# Patient Record
Sex: MALE | Race: WHITE | NOT HISPANIC OR LATINO | Employment: OTHER | URBAN - NONMETROPOLITAN AREA
[De-identification: names, ages, dates, MRNs, and addresses within clinical notes are randomized per-mention and may not be internally consistent; named-entity substitution may affect disease eponyms.]

---

## 2019-03-07 ENCOUNTER — OFFICE VISIT CONVERTED (OUTPATIENT)
Dept: FAMILY MEDICINE CLINIC | Age: 29
End: 2019-03-07
Attending: NURSE PRACTITIONER

## 2020-03-04 ENCOUNTER — OFFICE VISIT CONVERTED (OUTPATIENT)
Dept: FAMILY MEDICINE CLINIC | Age: 30
End: 2020-03-04
Attending: NURSE PRACTITIONER

## 2021-05-18 NOTE — PROGRESS NOTES
Neto Gonzales  1990     Office/Outpatient Visit    Visit Date: Wed, Mar 4, 2020 05:22 pm    Provider: Inés Gonzalez N.P. (Assistant: Lubna Oakley MA)    Location: [facility]        Electronically signed by Inés Gonzalez N.P. on  03/04/2020 05:58:55 PM                             Subjective:        CC: Bere is a 29 year old White male.  presents today due to complaints of cough and fever X 2 days         HPI:           In regard to the acute upper respiratory infection, unspecified, these have been present for the past one to two days.  The symptoms include body aches, Chills, cough, subjective fever and headache.  He denies exposure to ill contacts.  He has already tried to relieve the symptoms with Dayquil.      ROS:     CONSTITUTIONAL:  Positive for chills, fatigue, fever and body aches.      EYES:  Negative for blurred vision and eye drainage.      E/N/T:  Negative for ear pain and sore throat.      CARDIOVASCULAR:  Negative for chest pain and palpitations.      RESPIRATORY:  Positive for recent cough.   Negative for dyspnea or frequent wheezing.      NEUROLOGICAL:  Positive for headaches.      PSYCHIATRIC:  Negative for depression and suicidal thoughts.          Past Medical History / Family History / Social History:         Last Reviewed on 3/07/2019 11:25 AM by Lance Peña    Past Medical History:             PAST MEDICAL HISTORY         ASD, SLIGHT AORTIC INSUFF.  SBE PROPHYLAXIS     HEMOCHROMOTOSIS CARRIER         CURRENT MEDICAL PROVIDERS:    Cardiologist echocardiogram normal 2017         Surgical History:         graft to heart as infant;         Family History:         Positive for Hypertension.      Positive for Type 1 Diabetes.      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          Substance Abuse History:     Last Reviewed on 3/07/2019 11:25 AM by Lance Peña        Mental Health History:     Last Reviewed on 3/07/2019 11:25 AM by Lance Peña        Communicable Diseases (eg STDs):     Last Reviewed on 3/07/2019 11:25 AM by Lance Peña        Current Problems:     Last Reviewed on 3/07/2019 11:25 AM by Lance Peña    ADD    Acne    Atrial septal defect (ASD)    Anxiety    Dizziness    Other mixed anxiety disorders    Dizziness and giddiness    Cellulitis    Cellulitis of right lower limb        Immunizations:     DTP  (Diphtheria-Tetanus-whole cell Pertussis) 1991    DTP  (Diphtheria-Tetanus-whole cell Pertussis) 2/3/1992    DTP  (Diphtheria-Tetanus-whole cell Pertussis) 10/12/1992    DTP  (Diphtheria-Tetanus-whole cell Pertussis) 8/21/1995    DTP  (Diphtheria-Tetanus-whole cell Pertussis) 1991    Td adult 4/22/2002    Hib PRP-OMP (3-dose) 1991    Hib PRP-OMP (3-dose) 1991    Hib PRP-OMP (3-dose) 2/3/1992    Hib PRP-OMP (3-dose) 10/12/1992    Hep B (pedi/adol, 3-dose schedule) 2/25/2002    Hep B (pedi/adol, 3-dose schedule) 6/26/2002    Hep B (pedi/adol, 3-dose schedule) 10/17/2002    OPV  Poliovirus, live (oral) 1991    OPV  Poliovirus, live (oral) 1991    OPV  Poliovirus, live (oral) 10/12/1992    OPV  Poliovirus, live (oral) 8/21/1995    MMR  (Measles-Mumps-Rubella), live 4/9/1996    MMR  (Measles-Mumps-Rubella), live 10/12/1992    Varicella, live 2/25/2002    FluMist 9/11/2008    Menactra (Meningococcal MCV4P) 6/26/2007        Allergies:     Last Reviewed on 3/07/2019 11:25 AM by Lance Peña    No Known Allergies.        Current Medications:     Last Reviewed on 3/07/2019 11:25 AM by Peña, Lance Gaines Allergy 24 Hour 180mg Tablet [1 tab daily]    OTC Multivitamin Take one tablet once daily         Objective:        Vitals:         Historical:     3/7/2019  BP:    132/82 mm Hg ( (left arm, , standing, );) 3/7/2019  P:   86bpm ( (left arm (BP Cuff), , standing, );) 3/7/2019  Wt:   200lbs    Current: 3/4/2020 5:26:16 PM    Ht:  5 ft, 9.5 in;  Wt: 200.2 lbs;  BMI: 29.1T: 99 F (oral);  BP: 137/84 mm Hg (left arm, standing);  P: 109 bpm (left arm (BP Cuff), standing);  sCr: 0.81 mg/dL;  GFR: 135.79        Exams:     PHYSICAL EXAM:     GENERAL: well developed, well nourished;  no apparent distress;     EYES: PERRL, EOMI     E/N/T: EARS: external auditory canal normal;  both TMs are dull;  NOSE: normal turbinates; no sinus tenderness; OROPHARYNX: oral mucosa is normal; posterior pharynx shows no exudate and post nasal drip;     NECK: range of motion is normal; trachea is midline;     RESPIRATORY: normal appearance and symmetric expansion of chest wall; normal respiratory rate and pattern with no distress; normal breath sounds with no rales, rhonchi, wheezes or rubs;     CARDIOVASCULAR: normal rate; rhythm is regular;     MUSCULOSKELETAL: normal gait;     NEUROLOGIC: mental status: alert and oriented x 3; GROSSLY INTACT     PSYCHIATRIC: appropriate affect and demeanor;         Lab/Test Results:         Influenza A: Positive (03/04/2020),     Influenza B: Negative (03/04/2020),     Performed by:: [email or web address] (03/04/2020),             Assessment:         J09.X9   Influenza due to identified novel influenza A virus with other manifestations       Z13.31   Encounter for screening for depression           ORDERS:         Meds Prescribed:       [New Rx] Xofluza 40 mg oral tablet [take 2 tablets by oral route once], #2 (two) tablets, Refills: 0 (zero)       [New Rx] Tamiflu 75 mg oral capsule [take 1 capsule (75 mg) by oral route 2 times per day], #10 (ten) capsules, Refills: 0 (zero)       [New Rx] Tessalon Perles 100 mg oral capsule [take 1 capsule (100 mg) by oral route every 8 hours as needed for cough], #30 (thirty) capsules, Refills: 0 (zero)         Lab Orders:       [id number]  Infectious  agent antigen detection by immunoassay; Influenza  (In-House)            56576  Infectious agent antigen detection by immunoassay; Influenza  (In-House)              Other Orders:         Depression screen negative  (In-House)                      Plan:         Influenza due to identified novel influenza A virus with other manifestationsIf Xofluza to costly, will check price of Tamiflu. Aware not to take both.         RECOMMENDATIONS given include: Push Fluids, Rest, Follow up if no improvement or worsening symptoms like high fevers, vomiting, weakness, or increasing shortness of air.    .  [facility] Vaccines Flu and Pneumonia updated in Shot record     FOLLOW-UP: for Annual Checkup School/Work Excuse for Tomorrow Friday           Prescriptions:       [New Rx] Xofluza 40 mg oral tablet [take 2 tablets by oral route once], #2 (two) tablets, Refills: 0 (zero)       [New Rx] Tamiflu 75 mg oral capsule [take 1 capsule (75 mg) by oral route 2 times per day], #10 (ten) capsules, Refills: 0 (zero)       [New Rx] Tessalon Perles 100 mg oral capsule [take 1 capsule (100 mg) by oral route every 8 hours as needed for cough], #30 (thirty) capsules, Refills: 0 (zero)           Orders:       [id number]  Infectious agent antigen detection by immunoassay; Influenza  (In-House)            81285  Infectious agent antigen detection by immunoassay; Influenza  (In-House)              Encounter for screening for depression    [facility] PHQ-9 Depression Screening: Completed form scanned and in chart; Total Score 0; Negative Depression Screen           Orders:         Depression screen negative  (In-House)                  Charge Capture:         Primary Diagnosis:     J09.X9  Influenza due to identified novel influenza A virus with other manifestations           Orders:      11851  Office/outpatient visit; established patient, level 3  (In-House)            90820-48  Infectious agent antigen detection by immunoassay; Influenza  (In-House)             34362  Infectious agent antigen detection by immunoassay; Influenza  (In-House)              Z13.31  Encounter for screening for depression           Orders:        Depression screen negative  (In-House)

## 2021-05-18 NOTE — PROGRESS NOTES
Neto Gonzales 1990     Office/Outpatient Visit    Visit Date: Thu, Mar 7, 2019 11:10 am    Provider: Lance Peña N.P. (Assistant: Julianne Bernard MA)    Location: [facility]        Electronically signed by Lance Peña N.P. on  03/07/2019 11:52:37 AM                             SUBJECTIVE:        CC:     Bere is a 28 year old White male.  Patient is here for right neck/shoulder pain. He fell a couple of weeks ago and a gate landed on his chest.          HPI:         Patient complains of neck pain.  The location of discomfort is posterior and on the right side.  It radiates to the right shoulder and around right scapula.  The pain is characterized as moderate in intensity, intermittent, and aching.  Initial onset was 2 weeks ago.  The precipitating event [place] to have [place] was [place] on a building and fell off platform landed on his right side , no cuts or broken bones.      ROS:     CONSTITUTIONAL:  Negative for chills, fatigue and fever.      CARDIOVASCULAR:  Negative for chest pain, orthopnea, paroxysmal nocturnal dyspnea and pedal edema.      RESPIRATORY:  Negative for dyspnea and cough.      GASTROINTESTINAL:  Negative for abdominal pain, heartburn, constipation, diarrhea, and stool changes.      MUSCULOSKELETAL:  Positive for Neck pain and right shoulder pain.      PSYCHIATRIC:  Negative for anxiety and depression.          PMH/FMH/SH:     Last Reviewed on 3/07/2019 11:25 AM by Lance Peña    Past Medical History:             PAST MEDICAL HISTORY         ASD, SLIGHT AORTIC INSUFF.  SBE PROPHYLAXIS     HEMOCHROMOTOSIS CARRIER         CURRENT MEDICAL PROVIDERS:    Cardiologist echocardiogram normal 2017         Surgical History:         graft to heart as infant;         Family History:         Positive for Hypertension.      Positive for Type 1 Diabetes.      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              Current Problems:     Last Reviewed on 3/07/2019 11:25 AM by Lance Peña    Cellulitis     Anxiety     Dizziness     Atrial septal defect (ASD)     ADD     Acne     Neck pain         Immunizations:     DTP  (Diphtheria-Tetanus-whole cell Pertussis) 1991     DTP  (Diphtheria-Tetanus-whole cell Pertussis) 2/3/1992     DTP  (Diphtheria-Tetanus-whole cell Pertussis) 10/12/1992     DTP  (Diphtheria-Tetanus-whole cell Pertussis) 8/21/1995     DTP  (Diphtheria-Tetanus-whole cell Pertussis) 1991     Td adult 4/22/2002     Hib PRP-OMP (3-dose) 1991     Hib PRP-OMP (3-dose) 1991     Hib PRP-OMP (3-dose) 2/3/1992     Hib PRP-OMP (3-dose) 10/12/1992     Hep B (pedi/adol, 3-dose schedule) 2/25/2002     Hep B (pedi/adol, 3-dose schedule) 6/26/2002     Hep B (pedi/adol, 3-dose schedule) 10/17/2002     OPV  Poliovirus, live (oral) 1991     OPV  Poliovirus, live (oral) 1991     OPV  Poliovirus, live (oral) 10/12/1992     OPV  Poliovirus, live (oral) 8/21/1995     MMR  (Measles-Mumps-Rubella), live 4/9/1996     MMR  (Measles-Mumps-Rubella), live 10/12/1992     Varicella, live 2/25/2002     FluMist 9/11/2008     Menactra (Meningococcal MCV4P) 6/26/2007         Allergies:     Last Reviewed on 3/07/2019 11:25 AM by Lance Peña      No Known Drug Allergies.         Current Medications:     Last Reviewed on 3/07/2019 11:25 AM by Lance Peña    Allegra Allergy 24 Hour 180mg Tablet 1 tab daily     OTC Multivitamin Take one tablet once daily         OBJECTIVE:        Vitals:         Current: 3/7/2019 11:15:24 AM    Ht:  5 ft, 9.5 in;  Wt: 200 lbs;  BMI: 29.1    T: 98.3 F (oral);  BP: 132/82 mm Hg (left arm, standing);  P: 86 bpm (left arm (BP Cuff), standing);  sCr: 0.81 mg/dL;   GFR: 136.92        Exams:     PHYSICAL EXAM:     GENERAL: Vitals recorded well developed, well nourished;  well groomed;  no apparent distress;     RESPIRATORY: normal respiratory rate and pattern with no distress; normal breath sounds with no rales, rhonchi, wheezes or rubs;     CARDIOVASCULAR: normal rate; rhythm is regular;  normal S1; normal S2; no systolic murmur; no cyanosis; no edema;     GASTROINTESTINAL: nontender, nondistended; no hepatosplenomegaly or masses; no bruits;     MUSCULOSKELETAL: Right lateral posterior neck tenderness with Limited ROM, side to side mary. Right posterior shoulder tenderness along scapula area, Neg drop arm Neg Weathers right shoulder;     NEUROLOGIC: GROSSLY INTACT     PSYCHIATRIC:  appropriate affect and demeanor; normal speech pattern; grossly normal memory;         ASSESSMENT:           723.1   S16.1XXA  Neck pain              DDx:         ORDERS:         Meds Prescribed:       Mobic (Meloxicam) 7.5mg Tablet take one tablet daily with food, do not take with other NSAIDS, Motrin, or Ibuprofen, take x 4 weeks then stop  #30 (Thirty) tablet(s) Refills: 0       Baclofen 10mg Tablet 1 tab po TID prn for pain/muscle pain  #60 (Sixty) tablet(s) Refills: 1         Procedures Ordered:       REFER  Referral to Specialist or Other Facility  (Send-Out)                   PLAN:          Neck pain         REFERRALS:  Referral initiated to physical therapy ( [facility] ).            Prescriptions:       Mobic (Meloxicam) 7.5mg Tablet take one tablet daily with food, do not take with other NSAIDS, Motrin, or Ibuprofen, take x 4 weeks then stop  #30 (Thirty) tablet(s) Refills: 0       Baclofen 10mg Tablet 1 tab po TID prn for pain/muscle pain  #60 (Sixty) tablet(s) Refills: 1           Orders:       REFER  Referral to Specialist or Other Facility  (Send-Out)               CHARGE CAPTURE:           Primary Diagnosis:     723.1 Neck pain            S16.1XXA    Strain of muscle, fascia and tendon at  neck level, initial encounter              Orders:          [id number]   Office/outpatient visit; established patient, level 3  (In-House)

## 2021-07-01 VITALS
HEART RATE: 86 BPM | TEMPERATURE: 98.3 F | BODY MASS INDEX: 28.63 KG/M2 | WEIGHT: 200 LBS | HEIGHT: 70 IN | SYSTOLIC BLOOD PRESSURE: 132 MMHG | DIASTOLIC BLOOD PRESSURE: 82 MMHG

## 2021-07-02 VITALS
HEART RATE: 109 BPM | TEMPERATURE: 99 F | SYSTOLIC BLOOD PRESSURE: 137 MMHG | WEIGHT: 200.2 LBS | BODY MASS INDEX: 28.66 KG/M2 | DIASTOLIC BLOOD PRESSURE: 84 MMHG | HEIGHT: 70 IN

## 2021-07-27 ENCOUNTER — PROCEDURE VISIT (OUTPATIENT)
Dept: FAMILY MEDICINE CLINIC | Age: 31
End: 2021-07-27

## 2021-07-27 VITALS
BODY MASS INDEX: 28.29 KG/M2 | SYSTOLIC BLOOD PRESSURE: 125 MMHG | WEIGHT: 191 LBS | HEIGHT: 69 IN | DIASTOLIC BLOOD PRESSURE: 79 MMHG | HEART RATE: 80 BPM | TEMPERATURE: 98.3 F

## 2021-07-27 DIAGNOSIS — B07.9 WART ON THUMB: Primary | ICD-10-CM

## 2021-07-27 PROCEDURE — 17110 DESTRUCTION B9 LES UP TO 14: CPT | Performed by: FAMILY MEDICINE

## 2021-07-27 NOTE — PROGRESS NOTES
Neto Gonzales presents to [facility].    Chief Complaint: Wart on his right thumb    Subjective       History of Present Illness:  HPI patient has a wart on his right thumb that is really bothering him.  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  He has not tried any treatments for this    Review of Systems:  Review of Systems   Constitutional: Negative for chills, fatigue and fever.   HENT: Negative for congestion, ear discharge and sore throat.    Respiratory: Negative for shortness of breath.    Cardiovascular: Negative for chest pain.   Gastrointestinal: Negative for abdominal pain, constipation, diarrhea, nausea, vomiting and GERD.   Genitourinary: Negative for flank pain.   Neurological: Negative for dizziness and headache.   Psychiatric/Behavioral: Negative for depressed mood.        Objective   Medical History:  Past Medical History:   • Acne   • ADD (attention deficit disorder)   • Anxiety   • ASD (atrial septal defect)     SLIGHT AORTIC INSUFF   • Cellulitis of right lower limb   • Dizziness and giddiness   • Hemochromatosis carrier   • Influenza due to identified novel influenza A virus with other manifestations   • Other mixed anxiety disorders   • SBE (subacute bacterial endocarditis) prophylaxis candidate     Past Surgical History:   • CARDIAC SURGERY    graft to heart as infant;       Family History   Problem Relation Age of Onset   • Hypertension Other    • Diabetes type I Other    • Other Other         BLOOD DISORDER     Social History     Tobacco Use   • Smoking status: Never Smoker   • Smokeless tobacco: Never Used   Substance Use Topics   • Alcohol use: Not on file       Health Maintenance Due   Topic Date Due   • ANNUAL PHYSICAL  Never done   • COVID-19 Vaccine (1) Never done   • TDAP/TD VACCINES (2 - Tdap) 04/22/2012   • HEPATITIS C SCREENING  Never done        Immunization History   Administered Date(s) Administered   • DTP 1991,  "1990, 02/03/1992, 10/12/1992, 08/21/1995   • Hepatitis B 02/25/2002, 06/26/2002, 10/17/2002   • HiB 1990, 1990, 02/03/1992, 10/12/1992   • Influenza, Unspecified 09/11/2008   • MMR 10/12/1992, 04/09/1996   • Meningococcal MCV4P (Menactra) 06/26/2007   • OPV 1991, 1991, 10/12/1992, 08/21/1995   • Td 04/22/2002   • Varicella 02/25/2002       No Known Allergies     Medications:  No current outpatient medications on file prior to visit.     No current facility-administered medications on file prior to visit.       Vital Signs:   /79 (BP Location: Left arm, Patient Position: Sitting)   Pulse 80   Temp 98.3 °F (36.8 °C)   Ht 176.5 cm (69.49\")   Wt 86.6 kg (191 lb)   BMI 27.81 kg/m²       Physical Exam:  Physical Exam  Vitals reviewed.   Constitutional:       General: He is not in acute distress.     Appearance: Normal appearance. He is normal weight. He is not ill-appearing.   HENT:      Head: Normocephalic and atraumatic.   Eyes:      Extraocular Movements: Extraocular movements intact.      Pupils: Pupils are equal, round, and reactive to light.   Pulmonary:      Effort: Pulmonary effort is normal.   Skin:         Neurological:      General: No focal deficit present.      Mental Status: He is alert and oriented to person, place, and time.   Psychiatric:         Mood and Affect: Mood normal.         Behavior: Behavior normal.         Thought Content: Thought content normal.         Judgment: Judgment normal.         Result Review      The following data was reviewed by Beatrice Beal MD on 07/27/2021.  No results found for: WBC, HGB, HCT, MCV, PLT  No results found for: GLUCOSE, BUN, CREATININE, EGFRIFNONA, EGFRIFAFRI, BCR, K, CO2, CALCIUM, PROTENTOTREF, ALBUMIN, LABIL2, BILIRUBIN, AST, ALT  No results found for: CHOL, CHLPL, TRIG, HDL, LDL, LDLDIRECT  No results found for: TSH  No results found for: HGBA1C  No results found for: PSA           Cryotherapy, Skin " Lesion    Date/Time: 7/27/2021 6:37 PM  Performed by: Beatrice Beal MD  Authorized by: Beatrice Beal MD   Local anesthesia used: no    Anesthesia:  Local anesthesia used: no    Sedation:  Patient sedated: no    Patient tolerance: patient tolerated the procedure well with no immediate complications  Comments: Wart on right thumb status post liquid nitrogen 3   10-second sprays.  Patient tolerated well.  Good skin cleanings were advised to patient and to use topical antibiotic and Band-Aids and keep area clean and covered              Assessment and Plan:          Diagnoses and all orders for this visit:    1. Wart on thumb (Primary)-status post liquid nitrogen.  Patient tolerated well.  He is to follow-up for second freezing if it is not completely resolved.  Patient is aware of the risk of this procedure including bleeding infection scarring and recurrence        Follow Up   No follow-ups on file.  Patient was given instructions and counseling regarding his condition or for health maintenance advice. Please see specific information pulled into the AVS if appropriate.

## 2022-01-14 ENCOUNTER — OFFICE VISIT (OUTPATIENT)
Dept: FAMILY MEDICINE CLINIC | Age: 32
End: 2022-01-14

## 2022-01-14 VITALS
DIASTOLIC BLOOD PRESSURE: 87 MMHG | OXYGEN SATURATION: 97 % | WEIGHT: 198 LBS | TEMPERATURE: 98.8 F | BODY MASS INDEX: 26.82 KG/M2 | HEART RATE: 96 BPM | SYSTOLIC BLOOD PRESSURE: 126 MMHG | HEIGHT: 72 IN

## 2022-01-14 DIAGNOSIS — J02.9 PHARYNGITIS, UNSPECIFIED ETIOLOGY: Primary | ICD-10-CM

## 2022-01-14 LAB
EXPIRATION DATE: NORMAL
EXPIRATION DATE: NORMAL
FLUAV AG UPPER RESP QL IA.RAPID: NOT DETECTED
FLUBV AG UPPER RESP QL IA.RAPID: NOT DETECTED
INTERNAL CONTROL: NORMAL
INTERNAL CONTROL: NORMAL
Lab: NORMAL
Lab: NORMAL
S PYO AG THROAT QL: NEGATIVE
SARS-COV-2 AG UPPER RESP QL IA.RAPID: NOT DETECTED

## 2022-01-14 PROCEDURE — 87147 CULTURE TYPE IMMUNOLOGIC: CPT | Performed by: NURSE PRACTITIONER

## 2022-01-14 PROCEDURE — 99213 OFFICE O/P EST LOW 20 MIN: CPT | Performed by: NURSE PRACTITIONER

## 2022-01-14 PROCEDURE — 87081 CULTURE SCREEN ONLY: CPT | Performed by: NURSE PRACTITIONER

## 2022-01-14 PROCEDURE — 87880 STREP A ASSAY W/OPTIC: CPT | Performed by: NURSE PRACTITIONER

## 2022-01-14 PROCEDURE — 87428 SARSCOV & INF VIR A&B AG IA: CPT | Performed by: NURSE PRACTITIONER

## 2022-01-14 RX ORDER — FEXOFENADINE HCL 180 MG/1
180 TABLET ORAL DAILY
COMMUNITY

## 2022-01-14 RX ORDER — MULTIPLE VITAMINS W/ MINERALS TAB 9MG-400MCG
1 TAB ORAL DAILY
COMMUNITY

## 2022-01-14 RX ORDER — LANOLIN ALCOHOL/MO/W.PET/CERES
1000 CREAM (GRAM) TOPICAL DAILY
COMMUNITY

## 2022-01-14 RX ORDER — PSEUDOEPHEDRINE HCL 30 MG
30 TABLET ORAL EVERY 4 HOURS PRN
Qty: 30 TABLET | Refills: 0 | Status: SHIPPED | OUTPATIENT
Start: 2022-01-14

## 2022-01-14 RX ORDER — PREDNISONE 10 MG/1
30 TABLET ORAL DAILY
Qty: 15 TABLET | Refills: 0 | Status: SHIPPED | OUTPATIENT
Start: 2022-01-14 | End: 2022-01-19

## 2022-01-14 RX ORDER — MULTIVIT WITH MINERALS/LUTEIN
250 TABLET ORAL DAILY
COMMUNITY

## 2022-01-14 RX ORDER — AMOXICILLIN 875 MG/1
875 TABLET, COATED ORAL 2 TIMES DAILY
Qty: 20 TABLET | Refills: 0 | Status: SHIPPED | OUTPATIENT
Start: 2022-01-14 | End: 2022-01-24

## 2022-01-14 NOTE — PROGRESS NOTES
Chief Complaint  Neto Gonzales presents to [facility] for Sore Throat, Fever, and URI    Subjective          History of Present Illness    Bere is here today with c/o sore throat, congestion, chills, subjective fever, fatigue. Symptoms started 5 days ago. Taking OTC cold and flu medication for symptoms. No known ill contacts. Has not had covid or influenza vaccine.     Review of Systems      No Known Allergies   Past Medical History:   Diagnosis Date   • Acne    • ADD (attention deficit disorder)    • Anxiety    • ASD (atrial septal defect)      SLIGHT AORTIC INSUFF   • Cellulitis of right lower limb    • Dizziness and giddiness    • Hemochromatosis carrier    • Influenza due to identified novel influenza A virus with other manifestations    • Other mixed anxiety disorders    • SBE (subacute bacterial endocarditis) prophylaxis candidate      Current Outpatient Medications   Medication Sig Dispense Refill   • fexofenadine (ALLEGRA) 180 MG tablet Take 180 mg by mouth Daily.     • multivitamin with minerals tablet tablet Take 1 tablet by mouth Daily.     • vitamin B-12 (CYANOCOBALAMIN) 1000 MCG tablet Take 1,000 mcg by mouth Daily.     • vitamin C (ASCORBIC ACID) 250 MG tablet Take 250 mg by mouth Daily.     • amoxicillin (AMOXIL) 875 MG tablet Take 1 tablet by mouth 2 (Two) Times a Day for 10 days. 20 tablet 0   • predniSONE (DELTASONE) 10 MG tablet Take 3 tablets by mouth Daily for 5 days. 15 tablet 0   • pseudoephedrine (Sudafed) 30 MG tablet Take 1 tablet by mouth Every 4 (Four) Hours As Needed for Congestion. 30 tablet 0     No current facility-administered medications for this visit.     Past Surgical History:   Procedure Laterality Date   • CARDIAC SURGERY      graft to heart as infant;       Social History     Tobacco Use   • Smoking status: Never Smoker   • Smokeless tobacco: Never Used   Vaping Use   • Vaping Use: Never used   Substance Use Topics   • Alcohol use: Not on  "file   • Drug use: Not on file     Family History   Problem Relation Age of Onset   • Hypertension Other    • Diabetes type I Other    • Other Other         BLOOD DISORDER     Health Maintenance Due   Topic Date Due   • ANNUAL PHYSICAL  Never done   • TDAP/TD VACCINES (2 - Tdap) 04/22/2012   • HEPATITIS C SCREENING  Never done      Immunization History   Administered Date(s) Administered   • DTP 1990, 1990, 02/03/1992, 10/12/1992, 08/21/1995   • Hepatitis B 02/25/2002, 06/26/2002, 10/17/2002   • HiB 1991, 1991, 02/03/1992, 10/12/1992   • Influenza LAIV (Nasal) 09/11/2008   • Influenza, Unspecified 09/11/2008   • MMR 10/12/1992, 04/09/1996   • Meningococcal MCV4P (Menactra) 06/26/2007   • OPV 1991, 1991, 10/12/1992, 08/21/1995   • Td 04/22/2002   • Varicella 02/25/2002        Objective     Vitals:    01/14/22 0937   BP: 126/87   Pulse: 96   Temp: 98.8 °F (37.1 °C)   SpO2: 97%   Weight: 89.8 kg (198 lb)   Height: 182.9 cm (72\")     Body mass index is 26.85 kg/m².     Physical Exam  Vitals reviewed.   Constitutional:       General: He is not in acute distress.     Appearance: He is well-developed. He is ill-appearing.   HENT:      Head: Normocephalic and atraumatic.      Right Ear: Tympanic membrane normal.      Left Ear: Tympanic membrane normal.      Nose: Nose normal.      Mouth/Throat:      Pharynx: Uvula midline. Posterior oropharyngeal erythema present.   Cardiovascular:      Rate and Rhythm: Normal rate and regular rhythm.   Pulmonary:      Effort: Pulmonary effort is normal.      Breath sounds: Normal breath sounds.   Neurological:      Mental Status: He is alert and oriented to person, place, and time.   Psychiatric:         Mood and Affect: Mood and affect normal.           Result Review :     The following data was reviewed by: ÁNGEL Harrison on 01/14/2022:          POCT rapid strep A (01/14/2022 09:55)  POCT SARS-CoV-2 Antigen SHU + Flu (01/14/2022 09:54)           "        Assessment and Plan      Diagnoses and all orders for this visit:    1. Pharyngitis, unspecified etiology (Primary)  -     POCT SARS-CoV-2 Antigen SHU + Flu  -     POCT rapid strep A  -     amoxicillin (AMOXIL) 875 MG tablet; Take 1 tablet by mouth 2 (Two) Times a Day for 10 days.  Dispense: 20 tablet; Refill: 0  -     predniSONE (DELTASONE) 10 MG tablet; Take 3 tablets by mouth Daily for 5 days.  Dispense: 15 tablet; Refill: 0  -     pseudoephedrine (Sudafed) 30 MG tablet; Take 1 tablet by mouth Every 4 (Four) Hours As Needed for Congestion.  Dispense: 30 tablet; Refill: 0    Rapid flu, strep, and COVID testing negative.  Will treat with antibiotics based on symptoms and exam findings.  Additionally, advised warm salt water gargles, Tylenol or ibuprofen per package instructions.          Follow Up     Return for As needed for persistent or worsening symptoms.

## 2022-01-16 LAB — BACTERIA SPEC AEROBE CULT: NORMAL

## 2023-10-13 ENCOUNTER — OFFICE VISIT (OUTPATIENT)
Dept: FAMILY MEDICINE CLINIC | Age: 33
End: 2023-10-13
Payer: COMMERCIAL

## 2023-10-13 VITALS
SYSTOLIC BLOOD PRESSURE: 121 MMHG | HEART RATE: 80 BPM | DIASTOLIC BLOOD PRESSURE: 85 MMHG | BODY MASS INDEX: 26.9 KG/M2 | WEIGHT: 198.6 LBS | HEIGHT: 72 IN

## 2023-10-13 DIAGNOSIS — R13.10 DYSPHAGIA, UNSPECIFIED TYPE: Primary | ICD-10-CM

## 2023-10-13 DIAGNOSIS — Z00.00 PREVENTATIVE HEALTH CARE: ICD-10-CM

## 2023-10-13 NOTE — PROGRESS NOTES
Diagnoses and all orders for this visit:    1. Dysphagia, unspecified type (Primary)  Comments:  Will refer to gastroenterology for further evaluation and possible scope versus swallow study.  Orders:  -     Ambulatory Referral to Gastroenterology    2. Preventative health care  Comments:  Patient has not been seen in office in 18 months.  We will get him set up for physical with one of our PCPs.            Subjective     CHIEF COMPLAINT    Chief Complaint   Patient presents with    Difficulty Swallowing     Feels like food gets stuck x 1 year, getting worse            History of Present Illness  This is a 33-year-old male presenting to the clinic complaining of difficulty swallowing foods for the last year.  He states this occurs with any type of food he eats, but he is typically able to get food down if he takes a big drink of water with it.  He does not have any difficulty swallowing liquids.  He has not had any vomiting related to this and has not had any weight loss over the last year.            Review of Systems   Constitutional:  Negative for chills, diaphoresis and unexpected weight change.   HENT:  Positive for trouble swallowing.    Gastrointestinal:  Negative for abdominal pain, nausea and vomiting.            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      multivitamin with minerals tablet tablet Take 1 tablet by mouth Daily.      vitamin B-12 (CYANOCOBALAMIN) 1000 MCG tablet Take 1 tablet by mouth Daily.      vitamin C (ASCORBIC ACID) 250 MG tablet Take 1 tablet by mouth Daily.      [DISCONTINUED] pseudoephedrine (Sudafed) 30 MG tablet Take 1 tablet by mouth Every 4 (Four) Hours As Needed for Congestion. 30 tablet 0     No current facility-administered medications on file prior to visit.            /85 (BP Location: Left arm, Patient Position: Sitting)   Pulse 80   Ht 182.9 cm (72\")   Wt 90.1 kg (198 lb 9.6 oz)   BMI 26.94 kg/mý          Objective     Physical Exam  Vitals and nursing note reviewed.   Constitutional:       General: He is not in acute distress.     Appearance: Normal appearance.   HENT:      Head: Normocephalic and atraumatic.      Mouth/Throat:      Mouth: Mucous membranes are moist.      Pharynx: Oropharynx is clear. No posterior oropharyngeal erythema.   Cardiovascular:      Rate and Rhythm: Normal rate and regular rhythm.      Heart sounds: Normal heart sounds.   Pulmonary:      Effort: Pulmonary effort is normal.      Breath sounds: Normal breath sounds.   Abdominal:      General: There is no distension.      Palpations: Abdomen is soft.      Tenderness: There is no abdominal tenderness.   Skin:     General: Skin is warm and dry.   Neurological:      Mental Status: He is alert and oriented to person, place, and time.   Psychiatric:         Mood and Affect: Mood normal.         Behavior: Behavior normal.                      Diagnoses and all orders for " this visit:    1. Dysphagia, unspecified type (Primary)  Comments:  Will refer to gastroenterology for further evaluation and possible scope versus swallow study.  Orders:  -     Ambulatory Referral to Gastroenterology    2. Preventative health care  Comments:  Patient has not been seen in office in 18 months.  We will get him set up for physical with one of our PCPs.                           FOR FULL DISCHARGE INSTRUCTIONS/COMMENTS/HANDOUTS please see the   AVS

## 2023-10-30 ENCOUNTER — OFFICE VISIT (OUTPATIENT)
Dept: GASTROENTEROLOGY | Facility: CLINIC | Age: 33
End: 2023-10-30
Payer: COMMERCIAL

## 2023-10-30 VITALS
SYSTOLIC BLOOD PRESSURE: 130 MMHG | DIASTOLIC BLOOD PRESSURE: 80 MMHG | WEIGHT: 199.2 LBS | BODY MASS INDEX: 27.89 KG/M2 | HEART RATE: 76 BPM | HEIGHT: 71 IN

## 2023-10-30 DIAGNOSIS — R09.89 THROAT CLEARING: ICD-10-CM

## 2023-10-30 DIAGNOSIS — R13.10 DYSPHAGIA, UNSPECIFIED TYPE: Primary | ICD-10-CM

## 2023-10-30 DIAGNOSIS — T17.308A CHOKING, INITIAL ENCOUNTER: ICD-10-CM

## 2023-10-30 PROCEDURE — 99214 OFFICE O/P EST MOD 30 MIN: CPT | Performed by: NURSE PRACTITIONER

## 2023-10-30 RX ORDER — OMEPRAZOLE 20 MG/1
20 CAPSULE, DELAYED RELEASE ORAL DAILY
Qty: 30 CAPSULE | Refills: 3 | Status: SHIPPED | OUTPATIENT
Start: 2023-10-30

## 2023-10-30 NOTE — PROGRESS NOTES
Chief Complaint        Difficulty Swallowing    History of Present Illness      Neto Gonzales is a 33 y.o. male who presents to [facility] as a new patient for trouble swallowing.     He admits for the past 6 months he has been having trouble swallowing. Worse with solids. He admits he has been choking and had to cough up the food. He denies any reflux. He does have hx allergies and takes OTC allergy medicine. He denies any N&V.     EGD/colon---never had    He admits his bowels move EOD lately. He admits this is new for him. He admits he doesn't eat healthy. He denies any rectal bleeding or melena.     GI FH-----He denies any         Results       Result Review :   The following data was reviewed by: ÁNGEL Fuller on 10/30/2023                      Past Medical History       Past Medical History:   Diagnosis Date    Acne     ADD (attention deficit disorder)     Anxiety     ASD (atrial septal defect)      SLIGHT AORTIC INSUFF    Cellulitis of right lower limb     Dizziness and giddiness     Hemochromatosis carrier     Influenza due to identified novel influenza A virus with other manifestations     Other mixed anxiety disorders     SBE (subacute bacterial endocarditis) prophylaxis candidate        Past Surgical History:   Procedure Laterality Date    CARDIAC SURGERY      graft to heart as infant;          Current Outpatient Medications:     fexofenadine (ALLEGRA) 180 MG tablet, Take 1 tablet by mouth Daily., Disp: , Rfl:     multivitamin with minerals tablet tablet, Take 1 tablet by mouth Daily., Disp: , Rfl:     vitamin B-12 (CYANOCOBALAMIN) 1000 MCG tablet, Take 1 tablet by mouth Daily., Disp: , Rfl:     vitamin C (ASCORBIC ACID) 250 MG tablet, Take 1 tablet by mouth Daily., Disp: , Rfl:     omeprazole (priLOSEC) 20 MG capsule, Take 1 capsule by mouth Daily., Disp: 30 capsule, Rfl: 3     No Known Allergies    Family History   Problem Relation Age of Onset     "Hypertension Other     Diabetes type I Other     Other Other         BLOOD DISORDER        Social History     Social History Narrative    Not on file       Objective       Objective     Vital Signs:   /80 (BP Location: Left arm, Patient Position: Sitting, Cuff Size: Large Adult)   Pulse 76   Ht 180.3 cm (71\")   Wt 90.4 kg (199 lb 3.2 oz)   BMI 27.78 kg/m²     Body mass index is 27.78 kg/m².    Review of Systems   Constitutional:  Negative for appetite change, fatigue, fever and unexpected weight change.   HENT:  Positive for congestion, postnasal drip and trouble swallowing.    Respiratory:  Positive for choking. Negative for cough, chest tightness, shortness of breath, wheezing and stridor.    Cardiovascular:  Negative for chest pain, palpitations and leg swelling.   Gastrointestinal:  Negative for abdominal distention, abdominal pain, anal bleeding, blood in stool, constipation, diarrhea, nausea, rectal pain and vomiting.        Physical Exam  Constitutional:       General: He is not in acute distress.     Appearance: He is well-developed. He is not ill-appearing.   HENT:      Head: Normocephalic.   Eyes:      Pupils: Pupils are equal, round, and reactive to light.   Cardiovascular:      Rate and Rhythm: Normal rate and regular rhythm.      Heart sounds: Normal heart sounds.   Pulmonary:      Effort: Pulmonary effort is normal.      Breath sounds: Normal breath sounds.   Abdominal:      General: Bowel sounds are normal. There is no distension.      Palpations: Abdomen is soft. There is no mass.      Tenderness: There is no abdominal tenderness. There is no guarding or rebound.      Hernia: No hernia is present.   Musculoskeletal:         General: Normal range of motion.   Skin:     General: Skin is warm and dry.   Neurological:      Mental Status: He is alert and oriented to person, place, and time.   Psychiatric:         Speech: Speech normal.         Behavior: Behavior normal.         Judgment: " Judgment normal.              Assessment & Plan          Assessment and Plan    Diagnoses and all orders for this visit:    1. Dysphagia, unspecified type (Primary)  -     Case Request; Standing  -     Follow Anesthesia Guidelines / Protocol; Future  -     Obtain Informed Consent; Future  -     Case Request  -     omeprazole (priLOSEC) 20 MG capsule; Take 1 capsule by mouth Daily.  Dispense: 30 capsule; Refill: 3    2. Choking, initial encounter  -     Case Request; Standing  -     Follow Anesthesia Guidelines / Protocol; Future  -     Obtain Informed Consent; Future  -     Case Request    3. Throat clearing  -     Case Request; Standing  -     Follow Anesthesia Guidelines / Protocol; Future  -     Obtain Informed Consent; Future  -     Case Request    Other orders  -     Obtain Informed Consent; Standing  -     Verify NPO; Standing      Reviewed medical history with him today.  Given his history and current symptoms recommend EGD with Dr. Hoffmann for further evaluation.  Patient is agreeable to the scope.  We will start him on omeprazole 20 mg daily and see how he does.  Continue daily allergy medication.  Bowels moving well currently.  Recommend he increase his fiber and water as needed.  Patient to call the office with any issues.  Patient to follow-up with me after his scope.  Patient is agreeable to the plan.    The risk of the endoscopy were discussed in detail. Possible risks/complications, benefits, and alternatives to surgical or invasive procedure have been explained to patient and/or legal guardian; risks include bleeding, infection, and perforation. Patient has been evaluated and can tolerate anesthesia and/or sedation.           Follow Up       Follow Up   Return for F/U AFTER PROCEDURE.  Patient was given instructions and counseling regarding his condition or for health maintenance advice. Please see specific information pulled into the AVS if appropriate.

## 2023-10-31 ENCOUNTER — TELEPHONE (OUTPATIENT)
Dept: GASTROENTEROLOGY | Facility: CLINIC | Age: 33
End: 2023-10-31
Payer: COMMERCIAL

## 2023-10-31 NOTE — TELEPHONE ENCOUNTER
Neto Gonzales  1990    Patient requested to Reschedule their EGD. I have offered to reschedule this patient and patient has declined. Patient has been rescheduled to 11/2/2023.    Reason for cancelling/rescheduling: urgent cx list    This procedure was ordered by ÁNGEL Sal for an important reason. We want to inform you that there are risks associated with not proceeding with the procedure at this time such as a delay in diagnosis, risk of incurable disease, or cancer.    Patient plans to call us back to reschedule: no    Updated clearance needed?: no    If yes, clearance request has been submitted to: n/a    Is the patient currently on any injectable medications for weight loss or diabetes? no    If yes, are they aware that they will need to discontinue this 7 days prior to their procedure? N/a    Patient verbalized understanding for all of the above information.

## 2023-11-10 ENCOUNTER — PATIENT ROUNDING (BHMG ONLY) (OUTPATIENT)
Dept: GASTROENTEROLOGY | Facility: CLINIC | Age: 33
End: 2023-11-10
Payer: COMMERCIAL

## 2023-11-10 ENCOUNTER — PATIENT MESSAGE (OUTPATIENT)
Dept: GASTROENTEROLOGY | Facility: CLINIC | Age: 33
End: 2023-11-10
Payer: COMMERCIAL

## 2023-11-10 NOTE — PROGRESS NOTES
"11/10/2023      Hello, may I speak with Neto Gonzales     My name is Miryam. I am calling from [facility]. I show that you had a recent visit with ÁNGEL Sal.    Before we get started may I verify your date of birth? 1990    I am calling to officially welcome you to our practice and ask about your recent visit. Is this a good time to talk? Yes     Tell me about your visit with us. What things went well? \"Everything went well, I didn't have any issues\"     We strive to ensure that we protect your safety and privacy. Is there anything we could have done to improve this during your visit?    No     We're always looking for ways to make our patients' experiences even better. Do you have recommendations on ways we may improve?  No     Overall were you satisfied with your first visit to our practice? Yes     I appreciate you taking the time to speak with me today. Is there anything else I can do for you? No     I am glad to hear that you had a very good visit and I appreciate you taking the time to provide feedback on this call. We would greatly appreciate you filling out a survey if you receive one in the mail, email or text. This is a great opportunity to provide any additional feedback that you may think of after this call as well.       Thank you, and have a great day.   "

## 2023-12-07 NOTE — PRE-PROCEDURE INSTRUCTIONS
"Instructed on date and arrival time of 0930. Come to entrance \"C\".  Must have  over age 18 to drive home.  May have two visitors; however, children under 12 must stay in waiting room.  Discussed diet/NPO.  May take medications as usual except for blood thinners, diabetic medications, or weight loss medications.  Bring list of medications to hospital.  Verbalized understanding of instructions given.  Instructed to call for questions or concerns.  "

## 2023-12-08 ENCOUNTER — PATIENT ROUNDING (BHMG ONLY) (OUTPATIENT)
Dept: GASTROENTEROLOGY | Facility: CLINIC | Age: 33
End: 2023-12-08
Payer: COMMERCIAL

## 2023-12-08 NOTE — PROGRESS NOTES
"12/8/2023      Hello, may I speak with Neto Gonzales     My name is Miryam. I am calling from [facility]. I show that you had a recent visit with ÁNGEL Sal.    Before we get started may I verify your date of birth? 1990    I am calling to officially welcome you to our practice and ask about your recent visit. Is this a good time to talk? Yes     Tell me about your visit with us. What things went well? \"It went good, questions were answered, I learned more about what I have going on and I am scheduled for scope\"     We strive to ensure that we protect your safety and privacy. Is there anything we could have done to improve this during your visit?    No     We're always looking for ways to make our patients' experiences even better. Do you have recommendations on ways we may improve?  No     Overall were you satisfied with your first visit to our practice?  Yes     I appreciate you taking the time to speak with me today. Is there anything else I can do for you?  No     I am glad to hear that you had a very good visit and I appreciate you taking the time to provide feedback on this call. We would greatly appreciate you filling out a survey if you receive one in the mail, email or text. This is a great opportunity to provide any additional feedback that you may think of after this call as well.       Thank you, and have a great day.   "

## 2023-12-13 ENCOUNTER — ANESTHESIA EVENT (OUTPATIENT)
Dept: GASTROENTEROLOGY | Facility: HOSPITAL | Age: 33
End: 2023-12-13
Payer: COMMERCIAL

## 2023-12-13 NOTE — ANESTHESIA PREPROCEDURE EVALUATION
Anesthesia Evaluation     Patient summary reviewed and Nursing notes reviewed   NPO Solid Status: > 8 hours  NPO Liquid Status: > 2 hours           Airway   Mallampati: I  TM distance: >3 FB  Neck ROM: full  No difficulty expected  Dental - normal exam     Pulmonary - negative pulmonary ROS and normal exam   Cardiovascular - normal exam    (+) valvular problems/murmurs      Neuro/Psych  (+) dizziness/light headedness, psychiatric history Anxiety and ADD  GI/Hepatic/Renal/Endo - negative ROS     Musculoskeletal (-) negative ROS    Abdominal  - normal exam    Bowel sounds: normal.   Substance History - negative use     OB/GYN negative ob/gyn ROS         Other                    Anesthesia Plan    ASA 2     general   total IV anesthesia  (Total IV Anesthesia    Patient understands anesthesia not responsible for dental damage.  )  intravenous induction     Anesthetic plan, risks, benefits, and alternatives have been provided, discussed and informed consent has been obtained with: patient.    Plan discussed with CRNA.    CODE STATUS:

## 2023-12-14 ENCOUNTER — ANESTHESIA (OUTPATIENT)
Dept: GASTROENTEROLOGY | Facility: HOSPITAL | Age: 33
End: 2023-12-14
Payer: COMMERCIAL

## 2023-12-14 ENCOUNTER — HOSPITAL ENCOUNTER (OUTPATIENT)
Facility: HOSPITAL | Age: 33
Setting detail: HOSPITAL OUTPATIENT SURGERY
Discharge: HOME | End: 2023-12-14
Attending: INTERNAL MEDICINE | Admitting: INTERNAL MEDICINE
Payer: COMMERCIAL

## 2023-12-14 VITALS
OXYGEN SATURATION: 96 % | DIASTOLIC BLOOD PRESSURE: 82 MMHG | SYSTOLIC BLOOD PRESSURE: 123 MMHG | WEIGHT: 189.6 LBS | RESPIRATION RATE: 15 BRPM | HEART RATE: 78 BPM | BODY MASS INDEX: 26.44 KG/M2 | TEMPERATURE: 98.7 F

## 2023-12-14 DIAGNOSIS — R13.10 DYSPHAGIA, UNSPECIFIED TYPE: ICD-10-CM

## 2023-12-14 DIAGNOSIS — R09.89 THROAT CLEARING: ICD-10-CM

## 2023-12-14 DIAGNOSIS — T17.308A CHOKING, INITIAL ENCOUNTER: ICD-10-CM

## 2023-12-14 PROCEDURE — 88305 TISSUE EXAM BY PATHOLOGIST: CPT | Performed by: INTERNAL MEDICINE

## 2023-12-14 PROCEDURE — 25010000002 PROPOFOL 10 MG/ML EMULSION: Performed by: MARRIAGE & FAMILY THERAPIST

## 2023-12-14 PROCEDURE — 25810000003 LACTATED RINGERS PER 1000 ML

## 2023-12-14 PROCEDURE — C1726 CATH, BAL DIL, NON-VASCULAR: HCPCS | Performed by: INTERNAL MEDICINE

## 2023-12-14 RX ORDER — SODIUM CHLORIDE, SODIUM LACTATE, POTASSIUM CHLORIDE, CALCIUM CHLORIDE 600; 310; 30; 20 MG/100ML; MG/100ML; MG/100ML; MG/100ML
30 INJECTION, SOLUTION INTRAVENOUS CONTINUOUS
Status: DISCONTINUED | OUTPATIENT
Start: 2023-12-14 | End: 2023-12-14 | Stop reason: HOSPADM

## 2023-12-14 RX ORDER — LIDOCAINE HYDROCHLORIDE 20 MG/ML
INJECTION, SOLUTION EPIDURAL; INFILTRATION; INTRACAUDAL; PERINEURAL AS NEEDED
Status: DISCONTINUED | OUTPATIENT
Start: 2023-12-14 | End: 2023-12-14 | Stop reason: SURG

## 2023-12-14 RX ORDER — PROPOFOL 10 MG/ML
VIAL (ML) INTRAVENOUS AS NEEDED
Status: DISCONTINUED | OUTPATIENT
Start: 2023-12-14 | End: 2023-12-14 | Stop reason: SURG

## 2023-12-14 RX ADMIN — SODIUM CHLORIDE, POTASSIUM CHLORIDE, SODIUM LACTATE AND CALCIUM CHLORIDE 30 ML/HR: 600; 310; 30; 20 INJECTION, SOLUTION INTRAVENOUS at 09:59

## 2023-12-14 RX ADMIN — LIDOCAINE HYDROCHLORIDE 50 MG: 20 INJECTION, SOLUTION EPIDURAL; INFILTRATION; INTRACAUDAL; PERINEURAL at 10:47

## 2023-12-14 RX ADMIN — PROPOFOL 175 MCG/KG/MIN: 10 INJECTION, EMULSION INTRAVENOUS at 10:47

## 2023-12-14 RX ADMIN — PROPOFOL 150 MG: 10 INJECTION, EMULSION INTRAVENOUS at 10:47

## 2023-12-14 NOTE — ANESTHESIA POSTPROCEDURE EVALUATION
Patient: Neto Gonzales    Procedure Summary       Date: 12/14/23 Room / Location: [facility] ENDOSCOPY 3 / [facility] ENDOSCOPY    Anesthesia Start: 1044 Anesthesia Stop: 1102    Procedure: ESOPHAGOGASTRODUODENOSCOPY WITH BIOPSIES, BALLOON DILATATION 18-20 Diagnosis:       Dysphagia, unspecified type      Choking, initial encounter      Throat clearing      (Dysphagia, unspecified type [R13.10])      (Choking, initial encounter [T17.308A])      (Throat clearing [R09.89])    Surgeons: Nevin Hoffmann MD Provider: Rahul Sherman CRNA    Anesthesia Type: general ASA Status: 2            Anesthesia Type: general    Vitals  Vitals Value Taken Time   /82 12/14/23 1117   Temp 37.1 °C (98.7 °F) 12/14/23 1115   Pulse 78 12/14/23 1117   Resp 15 12/14/23 1115   SpO2 97 % 12/14/23 1117   Vitals shown include unfiled device data.        Post Anesthesia Care and Evaluation    Patient location during evaluation: PACU  Patient participation: complete - patient participated  Level of consciousness: awake  Pain scale: See nurse's notes for pain score.  Pain management: adequate    Airway patency: patent  Anesthetic complications: No anesthetic complications  PONV Status: none  Cardiovascular status: acceptable  Respiratory status: acceptable and spontaneous ventilation  Hydration status: acceptable    Comments: Patient seen and examined postoperatively; vital signs stable; SpO2 greater than or equal to 90%; cardiopulmonary status stable; nausea/vomiting adequately controlled; pain adequately controlled; no apparent anesthesia complications; patient discharged from anesthesia care when discharge criteria were met

## 2023-12-14 NOTE — H&P
Pre Procedure History & Physical    Chief Complaint:   dysphagia    Subjective     HPI:   34 yo M here for eval of dysphagia.    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 He has never used smokeless tobacco. He reports that he does not drink alcohol and does not use drugs.    Medications:   Medications Prior to Admission   Medication Sig Dispense Refill Last Dose    fexofenadine (ALLEGRA) 180 MG tablet Take 1 tablet by mouth Daily.       multivitamin with minerals tablet tablet Take 1 tablet by mouth Daily.       omeprazole (priLOSEC) 20 MG capsule Take 1 capsule by mouth Daily. 30 capsule 3     vitamin B-12 (CYANOCOBALAMIN) 1000 MCG tablet Take 1 tablet by mouth Daily.       vitamin C (ASCORBIC ACID) 250 MG tablet Take 1 tablet by mouth Daily.          Allergies:  Patient has no known allergies.    ROS:    Pertinent items are noted in HPI     Objective     Weight 86 kg (189 lb 9.5 oz).    Physical Exam   Constitutional: Pt is oriented to person, place, and time and well-developed, well-nourished, and in no distress.   Mouth/Throat: Oropharynx is clear and moist.   Neck: Normal range of motion.   Cardiovascular: Normal rate, regular rhythm and normal heart sounds.     Pulmonary/Chest: Effort normal and breath sounds normal.   Abdominal: Soft. Nontender  Skin: Skin is warm and dry.   Psychiatric: Mood, memory, affect and judgment normal.     Assessment & Plan     Diagnosis:  Dysphagia    Anticipated Surgical Procedure:  EGD    The risks, benefits, and alternatives of this procedure have been discussed with the patient or the responsible party- the patient understands and agrees to proceed.

## 2023-12-15 LAB
CYTO UR: NORMAL
LAB AP CASE REPORT: NORMAL
LAB AP CLINICAL INFORMATION: NORMAL
PATH REPORT.FINAL DX SPEC: NORMAL
PATH REPORT.GROSS SPEC: NORMAL

## 2023-12-19 ENCOUNTER — TELEPHONE (OUTPATIENT)
Dept: GASTROENTEROLOGY | Facility: CLINIC | Age: 33
End: 2023-12-19
Payer: COMMERCIAL

## 2023-12-19 NOTE — TELEPHONE ENCOUNTER
----- Message from ÁNGEL Fuller sent at 12/18/2023  1:58 PM EST -----  EGD biopsies did show mildly increased intraepithelial lymphocytes in the duodenal biopsy.  This is mild nonspecific.  Would check celiac blood test if patient is still having symptoms.  All other findings were benign.  Patient to have office follow-up scheduled.

## 2024-02-01 ENCOUNTER — OFFICE VISIT (OUTPATIENT)
Dept: FAMILY MEDICINE CLINIC | Age: 34
End: 2024-02-01
Payer: COMMERCIAL

## 2024-02-01 VITALS
SYSTOLIC BLOOD PRESSURE: 129 MMHG | OXYGEN SATURATION: 98 % | TEMPERATURE: 98.1 F | BODY MASS INDEX: 26.8 KG/M2 | HEIGHT: 71 IN | HEART RATE: 91 BPM | DIASTOLIC BLOOD PRESSURE: 93 MMHG | WEIGHT: 191.4 LBS

## 2024-02-01 DIAGNOSIS — R22.0 FACIAL SWELLING: Primary | ICD-10-CM

## 2024-02-01 DIAGNOSIS — R09.81 SINUS CONGESTION: ICD-10-CM

## 2024-02-01 LAB
EXPIRATION DATE: NORMAL
FLUAV AG UPPER RESP QL IA.RAPID: NOT DETECTED
FLUBV AG UPPER RESP QL IA.RAPID: NOT DETECTED
INTERNAL CONTROL: NORMAL
Lab: NORMAL
SARS-COV-2 AG UPPER RESP QL IA.RAPID: NOT DETECTED

## 2024-02-01 PROCEDURE — 99213 OFFICE O/P EST LOW 20 MIN: CPT | Performed by: PHYSICIAN ASSISTANT

## 2024-02-01 PROCEDURE — 87428 SARSCOV & INF VIR A&B AG IA: CPT | Performed by: PHYSICIAN ASSISTANT

## 2024-02-01 RX ORDER — METHYLPREDNISOLONE 4 MG/1
TABLET ORAL
Qty: 21 EACH | Refills: 0 | Status: SHIPPED | OUTPATIENT
Start: 2024-02-01

## 2024-02-01 RX ORDER — CETIRIZINE HYDROCHLORIDE 10 MG/1
10 TABLET ORAL DAILY
COMMUNITY

## 2024-02-01 NOTE — PROGRESS NOTES
Subjective     CHIEF COMPLAINT    Chief Complaint   Patient presents with    Facial Pain     Pt c/o of forehead swelling and (R) side of face. Pt woke up like this.             History of Present Illness  This is a 33-year-old male presenting to the clinic complaining of facial swelling over his forehead since this morning.  He has had some sinus congestion, pressure and subjective fever but states those symptoms are improving.  Otherwise he has been feeling well and denies any rashes, shortness of breath, nausea or vomiting.  He has not used any new foods, medications or products recently.  He has no history of similar symptoms.            Review of Systems   Constitutional:  Positive for fatigue and fever.   HENT:  Positive for congestion, facial swelling, sinus pressure, sinus pain and voice change. Negative for rhinorrhea.    Respiratory:  Positive for cough. Negative for shortness of breath and wheezing.    Gastrointestinal:  Negative for nausea and vomiting.            Past Medical History:   Diagnosis Date    Acne     ADD (attention deficit disorder)     Anxiety     ASD (atrial septal defect)      SLIGHT AORTIC INSUFF    Cellulitis of right lower limb     Dizziness and giddiness     Hemochromatosis carrier     Influenza due to identified novel influenza A virus with other manifestations     Other mixed anxiety disorders     SBE (subacute bacterial endocarditis) prophylaxis candidate             Past Surgical History:   Procedure Laterality Date    CARDIAC SURGERY      graft to heart as infant;     ENDOSCOPY N/A 12/14/2023    Procedure: ESOPHAGOGASTRODUODENOSCOPY WITH BIOPSIES, BALLOON DILATATION 18-20;  Surgeon: Nevin Hoffmann MD;  Location: [facility] ENDOSCOPY;  Service: Gastroenterology;  Laterality: N/A;  DUODENITIS            Family History   Problem Relation Age of Onset    Hypertension Other     Diabetes type I Other     Other Other         BLOOD DISORDER            Social History  "    Socioeconomic History    Marital status: Single    Number of children: 0   Tobacco Use    Smoking status: Never    Smokeless tobacco: Never   Vaping Use    Vaping Use: Never used   Substance and Sexual Activity    Alcohol use: Yes     Comment: social    Drug use: Never    Sexual activity: Defer            No Known Allergies         Current Outpatient Medications on File Prior to Visit   Medication Sig Dispense Refill    fexofenadine (ALLEGRA) 180 MG tablet Take 1 tablet by mouth Daily.      multivitamin with minerals tablet tablet Take 1 tablet by mouth Daily.      omeprazole (priLOSEC) 20 MG capsule Take 1 capsule by mouth Daily. 30 capsule 3    vitamin B-12 (CYANOCOBALAMIN) 1000 MCG tablet Take 1 tablet by mouth Daily.      vitamin C (ASCORBIC ACID) 250 MG tablet Take 1 tablet by mouth Daily.      cetirizine (zyrTEC) 10 MG tablet Take 1 tablet by mouth Daily.       No current facility-administered medications on file prior to visit.            /93   Pulse 91   Temp 98.1 °F (36.7 °C) (Oral)   Ht 180.3 cm (70.98\")   Wt 86.8 kg (191 lb 6.4 oz)   SpO2 98% Comment: room air  BMI 26.71 kg/m²          Objective     Physical Exam  Vitals and nursing note reviewed.   Constitutional:       General: He is not in acute distress.     Appearance: Normal appearance.   HENT:      Head: Atraumatic. Mass (see images below. tender to palpation, soft, non-mobile) present.      Right Ear: Tympanic membrane, ear canal and external ear normal.      Left Ear: Tympanic membrane, ear canal and external ear normal.      Nose: Congestion and rhinorrhea present.      Mouth/Throat:      Mouth: Mucous membranes are moist.      Pharynx: Oropharynx is clear. No posterior oropharyngeal erythema.   Eyes:      Extraocular Movements: Extraocular movements intact.      Conjunctiva/sclera: Conjunctivae normal.      Pupils: Pupils are equal, round, and reactive to light.   Cardiovascular:      Rate and Rhythm: Normal rate and regular " rhythm.      Heart sounds: Normal heart sounds.   Pulmonary:      Effort: Pulmonary effort is normal. No respiratory distress.      Breath sounds: Normal breath sounds.   Skin:     General: Skin is warm and dry.      Findings: No erythema.   Neurological:      Mental Status: He is alert and oriented to person, place, and time.   Psychiatric:         Mood and Affect: Mood normal.         Behavior: Behavior normal.                        Lab Results (last 24 hours)       Procedure Component Value Units Date/Time    POCT SARS-CoV-2 Antigen SHU + Flu [[id number]] Collected: 02/01/24 1143    Specimen: Swab Updated: 02/01/24 1144     SARS Antigen Not Detected     Influenza A Antigen SHU Not Detected     Influenza B Antigen SHU Not Detected     Internal Control Passed     Lot Number [id number]     Expiration Date 9/14/24                          Assessment & Plan  Facial swelling  Exact etiology is unclear.  There is no erythema of skin to suggest abscess.  He has not had any injury or trauma.  I did consider CT scan for further evaluation, but insurance would not approve it and given the above findings I do feel that this is reasonable.  Recommend ice packs.  He already takes Zyrtec and Allegra daily.  Could consider adding Benadryl, but I will also send Medrol Dosepak to his pharmacy to help with swelling.  Plan to check on him early next week.  Sinus congestion  COVID and flu testing negative.  The symptoms are improving, continue to monitor.    Orders Placed This Encounter   Procedures    POCT SARS-CoV-2 Antigen SHU + Flu     New Medications Ordered This Visit   Medications    methylPREDNISolone (MEDROL) 4 MG dose pack     Sig: Take by mouth as directed on package instructions.     Dispense:  21 each     Refill:  0                FOR FULL DISCHARGE INSTRUCTIONS/COMMENTS/HANDOUTS please see the   AVS

## 2024-02-13 ENCOUNTER — TELEPHONE (OUTPATIENT)
Dept: GASTROENTEROLOGY | Facility: CLINIC | Age: 34
End: 2024-02-13

## 2024-04-11 ENCOUNTER — OFFICE VISIT (OUTPATIENT)
Dept: GASTROENTEROLOGY | Facility: CLINIC | Age: 34
End: 2024-04-11
Payer: COMMERCIAL

## 2024-04-11 VITALS
WEIGHT: 203.4 LBS | BODY MASS INDEX: 27.55 KG/M2 | HEIGHT: 72 IN | HEART RATE: 97 BPM | DIASTOLIC BLOOD PRESSURE: 80 MMHG | SYSTOLIC BLOOD PRESSURE: 120 MMHG

## 2024-04-11 DIAGNOSIS — K21.9 GASTROESOPHAGEAL REFLUX DISEASE WITHOUT ESOPHAGITIS: Primary | ICD-10-CM

## 2024-04-11 DIAGNOSIS — R13.10 DYSPHAGIA, UNSPECIFIED TYPE: ICD-10-CM

## 2024-04-11 DIAGNOSIS — K26.9 DUODENAL ULCER: ICD-10-CM

## 2024-04-11 PROCEDURE — 99214 OFFICE O/P EST MOD 30 MIN: CPT | Performed by: NURSE PRACTITIONER

## 2024-04-11 RX ORDER — OMEPRAZOLE 20 MG/1
20 CAPSULE, DELAYED RELEASE ORAL DAILY
Qty: 30 CAPSULE | Refills: 11 | Status: SHIPPED | OUTPATIENT
Start: 2024-04-11

## 2024-04-11 NOTE — PROGRESS NOTES
Chief Complaint   Difficulty Swallowing    History of Present Illness       Neto Gonzales is a 33 y.o. male who presents to [facility] for follow-up for trouble swallowing.  He was last seen in the office by me on 10/30/2023.    He admits for the past 6 months he has been having trouble swallowing. Worse with solids. He admits he has been choking and had to cough up the food. He denies any reflux. He does have hx allergies and takes OTC allergy medicine. He denies any N&V.      EGD/colon---never had     He admits his bowels move EOD lately. He admits this is new for him. He admits he doesn't eat healthy. He denies any rectal bleeding or melena.      GI FH-----He denies any     He underwent EGD with Dr. Hoffmann on 12/14/2023 for trouble swallowing.  EGD did show some duodenal erosions.  Everything else was normal.  He was empirically dilated.  EGD path showed mildly increased intraepithelial lymphocytes in the duodenal biopsy.  This is mild and nonspecific.  Will check celiac blood test.    He admits initially after the EGD with dilation he was doing really well.  Unfortunately he ran out of the omeprazole 20 mg and just did not think he needed it anymore.  After quite a few days he started having trouble swallowing again and was having more reflux.  So he restarted it over-the-counter once a day.  He admits his bowels are moving well.  Good appetite.  Results       Result Review :                       Past Medical History       Past Medical History:   Diagnosis Date    Acne     ADD (attention deficit disorder)     Anxiety     ASD (atrial septal defect)      SLIGHT AORTIC INSUFF    Cellulitis of right lower limb     Dizziness and giddiness     Hemochromatosis carrier     Influenza due to identified novel influenza A virus with other manifestations     Other mixed anxiety disorders     SBE (subacute bacterial endocarditis) prophylaxis candidate        Past Surgical History:  "  Procedure Laterality Date    CARDIAC SURGERY      graft to heart as infant;     ENDOSCOPY N/A 12/14/2023    Procedure: ESOPHAGOGASTRODUODENOSCOPY WITH BIOPSIES, BALLOON DILATATION 18-20;  Surgeon: Nevin Hoffmann MD;  Location: [facility] ENDOSCOPY;  Service: Gastroenterology;  Laterality: N/A;  DUODENITIS         Current Outpatient Medications:     cetirizine (zyrTEC) 10 MG tablet, Take 1 tablet by mouth Daily., Disp: , Rfl:     fexofenadine (ALLEGRA) 180 MG tablet, Take 1 tablet by mouth Daily., Disp: , Rfl:     multivitamin with minerals tablet tablet, Take 1 tablet by mouth Daily., Disp: , Rfl:     omeprazole (priLOSEC) 20 MG capsule, Take 1 capsule by mouth Daily., Disp: 30 capsule, Rfl: 11    vitamin B-12 (CYANOCOBALAMIN) 1000 MCG tablet, Take 1 tablet by mouth Daily., Disp: , Rfl:     vitamin C (ASCORBIC ACID) 250 MG tablet, Take 1 tablet by mouth Daily., Disp: , Rfl:      No Known Allergies    Family History   Problem Relation Age of Onset    Hypertension Other     Diabetes type I Other     Other Other         BLOOD DISORDER        Social History     Social History Narrative    Not on file       Objective       Review of Systems   Constitutional:  Negative for appetite change, fatigue, fever, unexpected weight gain and unexpected weight loss.   HENT:  Negative for trouble swallowing.    Respiratory:  Negative for cough, choking, chest tightness, shortness of breath, wheezing and stridor.    Cardiovascular:  Negative for chest pain, palpitations and leg swelling.   Gastrointestinal:  Positive for GERD and indigestion. Negative for abdominal distention, abdominal pain, anal bleeding, blood in stool, constipation, diarrhea, nausea, rectal pain and vomiting.        Vital Signs:   /80 (BP Location: Left arm, Patient Position: Sitting, Cuff Size: Adult)   Pulse 97   Ht 182.9 cm (72\")   Wt 92.3 kg (203 lb 6.4 oz)   BMI 27.59 kg/m²       Physical Exam  Constitutional:       General: He is not in " acute distress.     Appearance: He is well-developed. He is not ill-appearing.   HENT:      Head: Normocephalic.   Eyes:      Pupils: Pupils are equal, round, and reactive to light.   Cardiovascular:      Rate and Rhythm: Normal rate and regular rhythm.      Heart sounds: Normal heart sounds.   Pulmonary:      Effort: Pulmonary effort is normal.      Breath sounds: Normal breath sounds.   Abdominal:      General: Bowel sounds are normal. There is no distension.      Palpations: Abdomen is soft. There is no mass.      Tenderness: There is no abdominal tenderness. There is no guarding or rebound.      Hernia: No hernia is present.   Musculoskeletal:         General: Normal range of motion.   Skin:     General: Skin is warm and dry.   Neurological:      Mental Status: He is alert and oriented to person, place, and time.   Psychiatric:         Speech: Speech normal.         Behavior: Behavior normal.         Judgment: Judgment normal.           Assessment & Plan          Assessment and Plan    Diagnoses and all orders for this visit:    1. Gastroesophageal reflux disease without esophagitis (Primary)  -     omeprazole (priLOSEC) 20 MG capsule; Take 1 capsule by mouth Daily.  Dispense: 30 capsule; Refill: 11  -     Celiac Panel Reflex To Titer; Future    2. Dysphagia, unspecified type  -     Celiac Panel Reflex To Titer; Future    3. Duodenal ulcer  -     Celiac Panel Reflex To Titer; Future      Reviewed EGD results with him today.  GERD seems much better when he is on the omeprazole 20 mg daily.  I will refill it.  Given the duodenal erosions noted in the abnormal lymphocytes seen will check celiac panel for further evaluation.  Continue GERD precautions.  Bowels moving well currently.  Good appetite.  Patient to call the office with any issues.  Patient to follow-up with me in 6 months.  Patient is agreeable to the plan.          Follow Up       Follow Up   Return in about 6 months (around 10/11/2024) for GERD,  DYSPHAGIA.  Patient was given instructions and counseling regarding his condition or for health maintenance advice. Please see specific information pulled into the AVS if appropriate.

## 2024-04-29 ENCOUNTER — OFFICE VISIT (OUTPATIENT)
Dept: FAMILY MEDICINE CLINIC | Age: 34
End: 2024-04-29
Payer: COMMERCIAL

## 2024-04-29 VITALS
DIASTOLIC BLOOD PRESSURE: 88 MMHG | SYSTOLIC BLOOD PRESSURE: 115 MMHG | HEART RATE: 80 BPM | BODY MASS INDEX: 26.95 KG/M2 | WEIGHT: 199 LBS | HEIGHT: 72 IN

## 2024-04-29 DIAGNOSIS — M79.601 RIGHT ARM PAIN: Primary | ICD-10-CM

## 2024-04-29 PROCEDURE — 99213 OFFICE O/P EST LOW 20 MIN: CPT | Performed by: PHYSICIAN ASSISTANT

## 2024-04-29 NOTE — PROGRESS NOTES
Subjective     CHIEF COMPLAINT    Chief Complaint   Patient presents with    Arm Pain     Pt c/o (R) arm numbness and pain onset for a couple of days.   Pt c/o bruise on bicep with knots present under the bruise.              History of Present Illness  This is a right hand dominant 33-year-old male presenting the clinic complaining of right arm bruising and tingling.  He states for the last couple of days he has had some tingling in his right arm, especially around his inner elbow.  He denies any weakness of his arm.  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  He also has noted some bruising on his left bicep area as well.  He states he came here today because his sister made him come in for an evaluation.            Review of Systems   Constitutional:  Negative for chills and fever.   Musculoskeletal:  Positive for myalgias. Negative for neck pain.   Skin:  Positive for wound (bruising).   Neurological:  Positive for numbness. Negative for weakness.            Past Medical History:   Diagnosis Date    Acne     ADD (attention deficit disorder)     Anxiety     ASD (atrial septal defect)      SLIGHT AORTIC INSUFF    Cellulitis of right lower limb     Dizziness and giddiness     Hemochromatosis carrier     Influenza due to identified novel influenza A virus with other manifestations     Other mixed anxiety disorders     SBE (subacute bacterial endocarditis) prophylaxis candidate             Past Surgical History:   Procedure Laterality Date    CARDIAC SURGERY      graft to heart as infant;     ENDOSCOPY N/A 12/14/2023    Procedure: ESOPHAGOGASTRODUODENOSCOPY WITH BIOPSIES, BALLOON DILATATION 18-20;  Surgeon: Nevin Hoffmann MD;  Location: [facility] ENDOSCOPY;  Service: Gastroenterology;  Laterality: N/A;  DUODENITIS            Family History   Problem Relation Age of Onset     "Hypertension Other     Diabetes type I Other     Other Other         BLOOD DISORDER            Social History     Socioeconomic History    Marital status: Single    Number of children: 0   Tobacco Use    Smoking status: Never    Smokeless tobacco: Never   Vaping Use    Vaping status: Never Used   Substance and Sexual Activity    Alcohol use: Yes     Comment: social    Drug use: Never    Sexual activity: Defer            No Known Allergies         Current Outpatient Medications on File Prior to Visit   Medication Sig Dispense Refill    cetirizine (zyrTEC) 10 MG tablet Take 1 tablet by mouth Daily.      fexofenadine (ALLEGRA) 180 MG tablet Take 1 tablet by mouth Daily.      multivitamin with minerals tablet tablet Take 1 tablet by mouth Daily.      omeprazole (priLOSEC) 20 MG capsule Take 1 capsule by mouth Daily. 30 capsule 11    vitamin B-12 (CYANOCOBALAMIN) 1000 MCG tablet Take 1 tablet by mouth Daily.      vitamin C (ASCORBIC ACID) 250 MG tablet Take 1 tablet by mouth Daily.       No current facility-administered medications on file prior to visit.            /88 (BP Location: Left arm, Patient Position: Sitting)   Pulse 80   Ht 182.9 cm (72\")   Wt 90.3 kg (199 lb)   BMI 26.99 kg/m²          Objective     Physical Exam  Vitals and nursing note reviewed.   Constitutional:       General: He is not in acute distress.     Appearance: Normal appearance.   Cardiovascular:      Pulses:           Radial pulses are 2+ on the right side and 2+ on the left side.   Pulmonary:      Effort: Pulmonary effort is normal. No respiratory distress.   Musculoskeletal:      Right upper arm: Tenderness (mild, over bruised areas) present. No swelling or edema.      Left upper arm: No swelling, edema or tenderness.      Right hand: Normal strength. Normal sensation.      Left hand: Normal strength. Normal sensation.   Skin:     General: Skin is warm and dry.      Findings: Bruising (light bruising noted to bilateral upper arms) " present. No erythema.   Neurological:      Mental Status: He is alert and oriented to person, place, and time.      Sensory: Sensation is intact. No sensory deficit (bilateral upper arms).   Psychiatric:         Mood and Affect: Mood normal.         Behavior: Behavior normal.                           Assessment & Plan  Right arm pain  No red flags on exam.  Despite reported tingling, Bere has no decreased sensation on exam.  Likely he has some mild trauma to a nerve, and this will continue to improve.  I have scheduled follow-up with PCP if it does not and he can return to the office sooner if he feels like things are worsening.  We discussed obtaining ultrasound of bruises/swollen areas and upper arm to better characterize, but he declines.  Could also consider EMG if tingling persists.                    FOR FULL DISCHARGE INSTRUCTIONS/COMMENTS/HANDOUTS please see the   AVS

## 2024-05-16 ENCOUNTER — TELEPHONE (OUTPATIENT)
Dept: GASTROENTEROLOGY | Facility: CLINIC | Age: 34
End: 2024-05-16
Payer: COMMERCIAL

## 2024-05-23 ENCOUNTER — OFFICE VISIT (OUTPATIENT)
Dept: FAMILY MEDICINE CLINIC | Age: 34
End: 2024-05-23
Payer: COMMERCIAL

## 2024-05-23 ENCOUNTER — LAB (OUTPATIENT)
Dept: LAB | Facility: HOSPITAL | Age: 34
End: 2024-05-23
Payer: COMMERCIAL

## 2024-05-23 VITALS
TEMPERATURE: 98.3 F | HEIGHT: 72 IN | SYSTOLIC BLOOD PRESSURE: 123 MMHG | HEART RATE: 85 BPM | DIASTOLIC BLOOD PRESSURE: 81 MMHG | BODY MASS INDEX: 26.87 KG/M2 | WEIGHT: 198.4 LBS | OXYGEN SATURATION: 98 %

## 2024-05-23 DIAGNOSIS — R53.83 FATIGUE, UNSPECIFIED TYPE: ICD-10-CM

## 2024-05-23 DIAGNOSIS — Z13.6 ENCOUNTER FOR SCREENING FOR CARDIOVASCULAR DISORDERS: ICD-10-CM

## 2024-05-23 DIAGNOSIS — J30.9 ALLERGIC RHINITIS, UNSPECIFIED SEASONALITY, UNSPECIFIED TRIGGER: ICD-10-CM

## 2024-05-23 DIAGNOSIS — Z11.59 ENCOUNTER FOR SCREENING FOR OTHER VIRAL DISEASES: ICD-10-CM

## 2024-05-23 DIAGNOSIS — K21.9 GASTROESOPHAGEAL REFLUX DISEASE WITHOUT ESOPHAGITIS: ICD-10-CM

## 2024-05-23 DIAGNOSIS — Z00.00 ANNUAL PHYSICAL EXAM: Primary | ICD-10-CM

## 2024-05-23 DIAGNOSIS — R13.10 DYSPHAGIA, UNSPECIFIED TYPE: ICD-10-CM

## 2024-05-23 DIAGNOSIS — R06.02 SHORTNESS OF BREATH: ICD-10-CM

## 2024-05-23 DIAGNOSIS — K26.9 DUODENAL ULCER: ICD-10-CM

## 2024-05-23 DIAGNOSIS — M79.10 MYALGIA: ICD-10-CM

## 2024-05-23 DIAGNOSIS — Z23 ENCOUNTER FOR IMMUNIZATION: ICD-10-CM

## 2024-05-23 LAB
ALBUMIN SERPL-MCNC: 4.8 G/DL (ref 3.5–5.2)
ALBUMIN/GLOB SERPL: 1.5 G/DL
ALP SERPL-CCNC: 89 U/L (ref 39–117)
ALT SERPL W P-5'-P-CCNC: 23 U/L (ref 1–41)
ANION GAP SERPL CALCULATED.3IONS-SCNC: 11.4 MMOL/L (ref 5–15)
AST SERPL-CCNC: 23 U/L (ref 1–40)
BILIRUB SERPL-MCNC: 0.4 MG/DL (ref 0–1.2)
BUN SERPL-MCNC: 10 MG/DL (ref 6–20)
BUN/CREAT SERPL: 10.9 (ref 7–25)
CALCIUM SPEC-SCNC: 9.8 MG/DL (ref 8.6–10.5)
CHLORIDE SERPL-SCNC: 102 MMOL/L (ref 98–107)
CO2 SERPL-SCNC: 23.6 MMOL/L (ref 22–29)
CREAT SERPL-MCNC: 0.92 MG/DL (ref 0.76–1.27)
DEPRECATED RDW RBC AUTO: 41.1 FL (ref 37–54)
EGFRCR SERPLBLD CKD-EPI 2021: 113.3 ML/MIN/1.73
ERYTHROCYTE [DISTWIDTH] IN BLOOD BY AUTOMATED COUNT: 11.9 % (ref 12.3–15.4)
GLOBULIN UR ELPH-MCNC: 3.1 GM/DL
GLUCOSE SERPL-MCNC: 92 MG/DL (ref 65–99)
HCT VFR BLD AUTO: 48 % (ref 37.5–51)
HCV AB SER QL: NORMAL
HGB BLD-MCNC: 16.3 G/DL (ref 13–17.7)
MCH RBC QN AUTO: 31.3 PG (ref 26.6–33)
MCHC RBC AUTO-ENTMCNC: 34 G/DL (ref 31.5–35.7)
MCV RBC AUTO: 92.3 FL (ref 79–97)
PLATELET # BLD AUTO: 263 10*3/MM3 (ref 140–450)
PMV BLD AUTO: 9.2 FL (ref 6–12)
POTASSIUM SERPL-SCNC: 4.1 MMOL/L (ref 3.5–5.2)
PROT SERPL-MCNC: 7.9 G/DL (ref 6–8.5)
RBC # BLD AUTO: 5.2 10*6/MM3 (ref 4.14–5.8)
SODIUM SERPL-SCNC: 137 MMOL/L (ref 136–145)
TSH SERPL DL<=0.05 MIU/L-ACNC: 2.08 UIU/ML (ref 0.27–4.2)
WBC NRBC COR # BLD AUTO: 7.56 10*3/MM3 (ref 3.4–10.8)

## 2024-05-23 PROCEDURE — 90471 IMMUNIZATION ADMIN: CPT | Performed by: FAMILY MEDICINE

## 2024-05-23 PROCEDURE — 86803 HEPATITIS C AB TEST: CPT

## 2024-05-23 PROCEDURE — 99214 OFFICE O/P EST MOD 30 MIN: CPT | Performed by: FAMILY MEDICINE

## 2024-05-23 PROCEDURE — 90715 TDAP VACCINE 7 YRS/> IM: CPT | Performed by: FAMILY MEDICINE

## 2024-05-23 PROCEDURE — 36415 COLL VENOUS BLD VENIPUNCTURE: CPT

## 2024-05-23 PROCEDURE — 82784 ASSAY IGA/IGD/IGG/IGM EACH: CPT

## 2024-05-23 PROCEDURE — 99395 PREV VISIT EST AGE 18-39: CPT | Performed by: FAMILY MEDICINE

## 2024-05-23 PROCEDURE — 86364 TISS TRNSGLTMNASE EA IG CLAS: CPT

## 2024-05-23 PROCEDURE — 80050 GENERAL HEALTH PANEL: CPT

## 2024-05-23 PROCEDURE — 86258 DGP ANTIBODY EACH IG CLASS: CPT

## 2024-05-23 RX ORDER — MONTELUKAST SODIUM 10 MG/1
10 TABLET ORAL NIGHTLY
Qty: 90 TABLET | Refills: 3 | Status: SHIPPED | OUTPATIENT
Start: 2024-05-23

## 2024-05-25 LAB
GLIADIN PEPTIDE IGA SER-ACNC: 8 UNITS (ref 0–19)
IGA SERPL-MCNC: 316 MG/DL (ref 90–386)
TTG IGA SER-ACNC: <2 U/ML (ref 0–3)

## 2024-05-28 ENCOUNTER — TELEPHONE (OUTPATIENT)
Dept: GASTROENTEROLOGY | Facility: CLINIC | Age: 34
End: 2024-05-28
Payer: COMMERCIAL

## 2024-05-28 NOTE — TELEPHONE ENCOUNTER
Miranda Marroquin, ÁNGEL Feldman, SURINDER Cooper  Celiac was normal.    Left voicemail for pt requesting a returned call to review results.

## 2024-08-22 ENCOUNTER — TELEPHONE (OUTPATIENT)
Dept: GASTROENTEROLOGY | Facility: CLINIC | Age: 34
End: 2024-08-22
Payer: COMMERCIAL

## 2024-08-22 NOTE — TELEPHONE ENCOUNTER
Contacted patient about appointment on 10/14/2024 due to provider not being in [place] at this time. Patient stated he was okay to be seen in the [place] office. Asked for a morning time on a Tues or Thursday. Rescheduled to 10/15/2024 at 9:45 am. Appointment reminder has been mailed.

## 2024-10-15 ENCOUNTER — OFFICE VISIT (OUTPATIENT)
Dept: GASTROENTEROLOGY | Facility: CLINIC | Age: 34
End: 2024-10-15
Payer: COMMERCIAL

## 2024-10-15 VITALS
WEIGHT: 205 LBS | HEART RATE: 78 BPM | DIASTOLIC BLOOD PRESSURE: 89 MMHG | OXYGEN SATURATION: 99 % | BODY MASS INDEX: 27.77 KG/M2 | HEIGHT: 72 IN | SYSTOLIC BLOOD PRESSURE: 126 MMHG

## 2024-10-15 DIAGNOSIS — K26.9 DUODENAL ULCER: ICD-10-CM

## 2024-10-15 DIAGNOSIS — K21.9 GASTROESOPHAGEAL REFLUX DISEASE WITHOUT ESOPHAGITIS: Primary | ICD-10-CM

## 2024-10-15 DIAGNOSIS — R13.10 DYSPHAGIA, UNSPECIFIED TYPE: ICD-10-CM

## 2024-10-15 PROCEDURE — 99214 OFFICE O/P EST MOD 30 MIN: CPT | Performed by: NURSE PRACTITIONER

## 2025-05-23 ENCOUNTER — TELEPHONE (OUTPATIENT)
Dept: FAMILY MEDICINE CLINIC | Age: 35
End: 2025-05-23

## 2025-05-23 NOTE — TELEPHONE ENCOUNTER
Hub to read, Called pt regarding late cancel, they did reschedule and was made aware of the late cancel policy- 5/23/25 AB

## 2025-05-27 ENCOUNTER — OFFICE VISIT (OUTPATIENT)
Dept: FAMILY MEDICINE CLINIC | Age: 35
End: 2025-05-27
Payer: COMMERCIAL

## 2025-05-27 VITALS
BODY MASS INDEX: 27.44 KG/M2 | SYSTOLIC BLOOD PRESSURE: 135 MMHG | HEIGHT: 72 IN | OXYGEN SATURATION: 100 % | TEMPERATURE: 98.3 F | DIASTOLIC BLOOD PRESSURE: 86 MMHG | HEART RATE: 88 BPM | WEIGHT: 202.6 LBS

## 2025-05-27 DIAGNOSIS — Z23 ENCOUNTER FOR IMMUNIZATION: ICD-10-CM

## 2025-05-27 DIAGNOSIS — J30.9 ALLERGIC RHINITIS, UNSPECIFIED SEASONALITY, UNSPECIFIED TRIGGER: ICD-10-CM

## 2025-05-27 DIAGNOSIS — K21.9 GASTROESOPHAGEAL REFLUX DISEASE WITHOUT ESOPHAGITIS: ICD-10-CM

## 2025-05-27 DIAGNOSIS — Z00.00 ANNUAL PHYSICAL EXAM: Primary | ICD-10-CM

## 2025-05-27 DIAGNOSIS — M25.50 ARTHRALGIA, UNSPECIFIED JOINT: ICD-10-CM

## 2025-05-27 DIAGNOSIS — F41.9 ANXIETY: ICD-10-CM

## 2025-05-27 PROCEDURE — 99395 PREV VISIT EST AGE 18-39: CPT | Performed by: FAMILY MEDICINE

## 2025-05-27 PROCEDURE — 99214 OFFICE O/P EST MOD 30 MIN: CPT | Performed by: FAMILY MEDICINE

## 2025-05-27 RX ORDER — MONTELUKAST SODIUM 10 MG/1
10 TABLET ORAL NIGHTLY
Qty: 90 TABLET | Refills: 3 | Status: SHIPPED | OUTPATIENT
Start: 2025-05-27

## 2025-05-27 RX ORDER — FEXOFENADINE HCL 180 MG/1
180 TABLET ORAL DAILY
Qty: 90 TABLET | Refills: 3 | Status: SHIPPED | OUTPATIENT
Start: 2025-05-27

## 2025-05-27 RX ORDER — FLUTICASONE PROPIONATE 50 MCG
2 SPRAY, SUSPENSION (ML) NASAL DAILY
COMMUNITY

## 2025-05-27 RX ORDER — PAROXETINE 10 MG/1
10 TABLET, FILM COATED ORAL EVERY MORNING
Qty: 30 TABLET | Refills: 0 | Status: SHIPPED | OUTPATIENT
Start: 2025-05-27

## 2025-05-27 RX ORDER — OMEPRAZOLE 20 MG/1
20 CAPSULE, DELAYED RELEASE ORAL DAILY
Qty: 90 CAPSULE | Refills: 3 | Status: SHIPPED | OUTPATIENT
Start: 2025-05-27
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
## 2025-06-24 ENCOUNTER — TELEPHONE (OUTPATIENT)
Dept: FAMILY MEDICINE CLINIC | Age: 35
End: 2025-06-24

## 2025-06-24 ENCOUNTER — OFFICE VISIT (OUTPATIENT)
Dept: FAMILY MEDICINE CLINIC | Age: 35
End: 2025-06-24
Payer: COMMERCIAL

## 2025-06-24 VITALS
SYSTOLIC BLOOD PRESSURE: 127 MMHG | HEART RATE: 77 BPM | OXYGEN SATURATION: 96 % | HEIGHT: 72 IN | TEMPERATURE: 97.5 F | DIASTOLIC BLOOD PRESSURE: 82 MMHG | BODY MASS INDEX: 26.82 KG/M2 | WEIGHT: 198 LBS

## 2025-06-24 DIAGNOSIS — R68.82 DECREASED LIBIDO: ICD-10-CM

## 2025-06-24 DIAGNOSIS — F41.9 ANXIETY: Primary | ICD-10-CM

## 2025-06-24 DIAGNOSIS — F51.01 PRIMARY INSOMNIA: ICD-10-CM

## 2025-06-24 DIAGNOSIS — K21.9 GASTROESOPHAGEAL REFLUX DISEASE WITHOUT ESOPHAGITIS: ICD-10-CM

## 2025-06-24 DIAGNOSIS — J30.9 ALLERGIC RHINITIS, UNSPECIFIED SEASONALITY, UNSPECIFIED TRIGGER: ICD-10-CM

## 2025-06-24 PROCEDURE — 99214 OFFICE O/P EST MOD 30 MIN: CPT | Performed by: FAMILY MEDICINE

## 2025-06-24 RX ORDER — BUPROPION HYDROCHLORIDE 100 MG/1
100 TABLET, EXTENDED RELEASE ORAL DAILY
Qty: 30 TABLET | Refills: 2 | Status: SHIPPED | OUTPATIENT
Start: 2025-06-24 | End: 2025-06-24

## 2025-06-24 RX ORDER — PAROXETINE 10 MG/1
5 TABLET, FILM COATED ORAL EVERY MORNING
Qty: 45 TABLET | Refills: 0 | Status: SHIPPED | OUTPATIENT
Start: 2025-06-24

## 2025-06-24 RX ORDER — PAROXETINE 10 MG/1
10 TABLET, FILM COATED ORAL EVERY MORNING
Qty: 30 TABLET | Refills: 2 | Status: SHIPPED | OUTPATIENT
Start: 2025-06-24 | End: 2025-06-24 | Stop reason: SDUPTHER

## 2025-06-24 RX ORDER — PAROXETINE 10 MG/1
5 TABLET, FILM COATED ORAL EVERY MORNING
Qty: 45 TABLET | Refills: 0 | Status: SHIPPED | OUTPATIENT
Start: 2025-06-24 | End: 2025-06-24

## 2025-06-24 RX ORDER — BUPROPION HYDROCHLORIDE 100 MG/1
100 TABLET, EXTENDED RELEASE ORAL DAILY
Qty: 30 TABLET | Refills: 2 | Status: SHIPPED | OUTPATIENT
Start: 2025-06-24

## 2025-06-24 NOTE — PROGRESS NOTES
Neto Gonzales presents to [facility].    Chief Complaint: Follow-up for his anxiety and stress    Subjective     History of Present Illness:  Anxiety  Visit:  Follow-up  Follow-up visit:     Medication compliance:  %    Side effects:  Sexual problems    Symptoms: decreased concentration, dizziness, excessive worry, impotence, insomnia, irritability and nervous/anxious      Symptoms: no chest pain, no depressed mood, no nausea, no palpitations, no panic, no restlessness, no shortness of breath and no suicidal ideas      Frequency:  Occasionally    Severity:  Mild    Sleep quality:  Poor    Treatments tried:  SSRIs    Improvement on treatment:  Moderate    I am following up with Bere today for his chronic anxiety and stress, especially in the work environment.  His job is very stressful.  Last time I saw him he had passed out from being overworked in the heat.  He is a farmer.  He denies feelings of sadness or depression.  He was initiated last visit on Paxil and this has worked extremely well for him.  He feels calm and no longer is easily agitated or anxious.  However he says he feels like he almost has no emotion.  In addition he is experienced some erectile dysfunction since starting the Paxil.  He says other family members and his girlfriend have noticed a significant improvement of his anxiety.  I am concerned about the ED because he is young and I feel like his medication needs to be adjusted to help accommodate this.  He is still not sleeping great at night.  Has trouble falling asleep due to racing thoughts    He presents with chronic allergies are stable and improved on Singulair, flonase and allegra, works in a [employer] environment-recc he wear a mask at work    He presents with chronic GERD and is stable and well-controlled on Prilosec, history of EGD with Dr. Hoffmann with esophageal dilation        Result Review   The following data was reviewed by Beatrice Beal,  "MD on 06/24/2025.  Lab Results   Component Value Date    WBC 7.56 05/23/2024    HGB 16.3 05/23/2024    HCT 48.0 05/23/2024    MCV 92.3 05/23/2024     05/23/2024     Lab Results   Component Value Date    GLUCOSE 92 05/23/2024    BUN 10 05/23/2024    CREATININE 0.92 05/23/2024     05/23/2024    K 4.1 05/23/2024     05/23/2024    CALCIUM 9.8 05/23/2024    PROTEINTOT 7.9 05/23/2024    ALBUMIN 4.8 05/23/2024    ALT 23 05/23/2024    AST 23 05/23/2024    ALKPHOS 89 05/23/2024    BILITOT 0.4 05/23/2024    GLOB 3.1 05/23/2024    AGRATIO 1.5 05/23/2024    BCR 10.9 05/23/2024    ANIONGAP 11.4 05/23/2024    EGFR 113.3 05/23/2024     No results found for: \"CHOL\", \"CHLPL\", \"TRIG\", \"HDL\", \"LDL\", \"LDLDIRECT\"  Lab Results   Component Value Date    TSH 2.080 05/23/2024     No results found for: \"HGBA1C\"  No results found for: \"PSA\"  No results found for: \"IRON\"   No results found for: \"XBEF23RW\"            Assessment and Plan:   Assessment & Plan  Anxiety  Will decrease his Paxil to 5 mg daily and add Wellbutrin  mg once a day  Orders:    PARoxetine (Paxil) 10 MG tablet; Take 1/2 tablet by mouth Every Morning.    buPROPion SR (Wellbutrin SR) 100 MG 12 hr tablet; Take 1 tablet by mouth Daily.    Decreased libido  Will add Wellbutrin  mg once a day to see if this helps improve his decreased libido  Orders:    buPROPion SR (Wellbutrin SR) 100 MG 12 hr tablet; Take 1 tablet by mouth Daily.    Allergic rhinitis, unspecified seasonality, unspecified trigger  Stable on Singulair Allegra and Flonase nasal spray       Gastroesophageal reflux disease without esophagitis  Stable on Prilosec.  He is to avoid spicy acidic food in his diet and avoid NSAIDs       Primary insomnia  Will treat his anxiety by decreasing Paxil to 5 mg and adding Wellbutrin.  If no improvement he may need a sleep aid at night                  Objective     Medications:  Current Outpatient Medications   Medication Instructions    buPROPion " "SR (WELLBUTRIN SR) 100 mg, Oral, Daily    fexofenadine (ALLEGRA) 180 mg, Oral, Daily    fluticasone (FLONASE) 50 MCG/ACT nasal spray 2 sprays, Daily    montelukast (SINGULAIR) 10 mg, Oral, Nightly    multivitamin with minerals tablet tablet 1 tablet, Daily    omeprazole (PRILOSEC) 20 mg, Oral, Daily    PARoxetine (Paxil) 10 MG tablet Take 1/2 tablet by mouth Every Morning.        Vital Signs:   /82 (BP Location: Left arm, Patient Position: Sitting, Cuff Size: Adult)   Pulse 77   Temp 97.5 °F (36.4 °C) (Oral)   Ht 182.9 cm (72\")   Wt 89.8 kg (198 lb)   SpO2 96%   BMI 26.85 kg/m²     BMI is >= 25 and <30. (Overweight) The following options were offered after discussion;: exercise counseling/recommendations and nutrition counseling/recommendations     BP Readings from Last 3 Encounters:   06/24/25 127/82   05/27/25 135/86   10/15/24 126/89      Wt Readings from Last 3 Encounters:   06/24/25 89.8 kg (198 lb)   05/27/25 91.9 kg (202 lb 9.6 oz)   10/15/24 93 kg (205 lb)        Physical Exam:  Physical Exam  Vitals and nursing note reviewed.   Constitutional:       General: He is not in acute distress.     Appearance: Normal appearance. He is not ill-appearing, toxic-appearing or diaphoretic.   HENT:      Head: Normocephalic and atraumatic.      Right Ear: Tympanic membrane, ear canal and external ear normal.      Left Ear: Tympanic membrane, ear canal and external ear normal.      Nose: No congestion or rhinorrhea.      Mouth/Throat:      Mouth: Mucous membranes are moist.      Pharynx: Oropharynx is clear. No oropharyngeal exudate or posterior oropharyngeal erythema.   Eyes:      Extraocular Movements: Extraocular movements intact.      Conjunctiva/sclera: Conjunctivae normal.      Pupils: Pupils are equal, round, and reactive to light.   Cardiovascular:      Rate and Rhythm: Normal rate and regular rhythm.      Heart sounds: Normal heart sounds.   Pulmonary:      Effort: Pulmonary effort is normal.      " Breath sounds: Normal breath sounds. No wheezing, rhonchi or rales.   Abdominal:      General: Abdomen is flat.      Palpations: Abdomen is soft. There is no mass.      Tenderness: There is no abdominal tenderness.      Hernia: No hernia is present.   Musculoskeletal:      Cervical back: Neck supple. No rigidity.      Right lower leg: No edema.      Left lower leg: No edema.   Lymphadenopathy:      Cervical: No cervical adenopathy.   Skin:     General: Skin is warm and dry.   Neurological:      General: No focal deficit present.      Mental Status: He is alert and oriented to person, place, and time. Mental status is at baseline.   Psychiatric:         Mood and Affect: Mood normal.         Behavior: Behavior normal.         Thought Content: Thought content normal.         Judgment: Judgment normal.           Review of Systems:  Review of Systems   Constitutional:  Positive for irritability. Negative for chills and fever.   HENT:  Positive for congestion and rhinorrhea. Negative for ear discharge and sore throat.    Respiratory:  Negative for cough, shortness of breath and wheezing.    Cardiovascular:  Negative for chest pain, palpitations and leg swelling.   Gastrointestinal:  Negative for abdominal pain, constipation, diarrhea, nausea, vomiting and GERD.   Genitourinary:  Positive for impotence. Negative for flank pain.   Neurological:  Positive for dizziness. Negative for headache.   Psychiatric/Behavioral:  Positive for decreased concentration and sleep disturbance. Negative for suicidal ideas and depressed mood. The patient is nervous/anxious and has insomnia.               Follow Up   Return in about 1 month (around 7/24/2025) for Recheck.    Part of this note may be an electronic transcription/translation of spoken language to printed   text using the Dragon Dictation System.              Health Maintenance   Topic Date Due    COVID-19 Vaccine (1 - 2024-25 season) 11/27/2025 (Originally 9/1/2024)    INFLUENZA  VACCINE  07/01/2025    ANNUAL PHYSICAL  05/27/2026    TDAP/TD VACCINES (3 - Td or Tdap) 05/23/2034    HEPATITIS C SCREENING  Completed    Pneumococcal Vaccine 0-49  Aged Out          Medical History:  Medications Discontinued During This Encounter   Medication Reason    PARoxetine (Paxil) 10 MG tablet Reorder    PARoxetine (Paxil) 10 MG tablet Reorder    PARoxetine (Paxil) 10 MG tablet     buPROPion SR (Wellbutrin SR) 100 MG 12 hr tablet       Past Medical History:    Acne    ADD (attention deficit disorder)    Anxiety    ASD (atrial septal defect)     SLIGHT AORTIC INSUFF    Cellulitis of right lower limb    Dizziness and giddiness    Hemochromatosis carrier    Influenza due to identified novel influenza A virus with other manifestations    Other mixed anxiety disorders    SBE (subacute bacterial endocarditis) prophylaxis candidate     Past Surgical History:    CARDIAC SURGERY    graft to heart as infant;     ENDOSCOPY    Procedure: ESOPHAGOGASTRODUODENOSCOPY WITH BIOPSIES, BALLOON DILATATION 18-20;  Surgeon: Nevin Hoffmann MD;  Location: [facility] ENDOSCOPY;  Service: Gastroenterology;  Laterality: N/A;  DUODENITIS      Family History   Problem Relation Age of Onset    Hypertension Other     Diabetes type I Other     Other Other         BLOOD DISORDER     Social History     Tobacco Use    Smoking status: Never    Smokeless tobacco: Never   Substance Use Topics    Alcohol use: Yes     Comment: social       There are no preventive care reminders to display for this patient.     Immunization History   Administered Date(s) Administered    DTP 1991, 1991, 02/03/1992, 10/12/1992, 08/21/1995    FluMist 2-49yrs (Nasal) 09/11/2008    Hepatitis B Adult/Adolescent IM 02/25/2002, 06/26/2002, 10/17/2002    HiB 1991, 1991, 02/03/1992, 10/12/1992    Influenza, Unspecified 09/11/2008    MMR 10/12/1992, 04/09/1996    Meningococcal MCV4P (Menactra) 06/26/2007    OPV 1991, 1991, 10/12/1992,  08/21/1995    Td (TDVAX) 04/22/2002    Tdap 05/23/2024    Varicella 02/25/2002       Allergies   Allergen Reactions    Gluten Meal Other (See Comments)     Esophageal shrinks, bloats.

## (undated) DEVICE — SOL IRRG H2O PL/BG 1000ML STRL

## (undated) DEVICE — LINER SURG CANSTR SXN S/RIGD 1500CC

## (undated) DEVICE — SINGLE-USE BIOPSY FORCEPS: Brand: RADIAL JAW 4

## (undated) DEVICE — Device: Brand: DEFENDO AIR/WATER/SUCTION AND BIOPSY VALVE

## (undated) DEVICE — ESOPHAGEAL BALLOON DILATATION CATHETER: Brand: CRE FIXED WIRE

## (undated) DEVICE — CONN JET HYDRA H20 AUXILIARY DISP

## (undated) DEVICE — SOLIDIFIER LIQLOC PLS 1500CC BT

## (undated) DEVICE — BLCK/BITE BLOX WO/DENTL/RIM W/STRAP 54F

## (undated) DEVICE — DEV INFL CRE STERIFLATE 60CC DISP

## (undated) DEVICE — Device